# Patient Record
Sex: FEMALE | Race: BLACK OR AFRICAN AMERICAN | NOT HISPANIC OR LATINO | Employment: FULL TIME | URBAN - METROPOLITAN AREA
[De-identification: names, ages, dates, MRNs, and addresses within clinical notes are randomized per-mention and may not be internally consistent; named-entity substitution may affect disease eponyms.]

---

## 2017-03-27 ENCOUNTER — HOSPITAL ENCOUNTER (OUTPATIENT)
Dept: RADIOLOGY | Facility: HOSPITAL | Age: 42
Discharge: HOME | End: 2017-03-27
Attending: SPECIALIST
Payer: COMMERCIAL

## 2017-03-27 DIAGNOSIS — Z12.31 VISIT FOR SCREENING MAMMOGRAM: ICD-10-CM

## 2017-03-27 PROCEDURE — 77063 BREAST TOMOSYNTHESIS BI: CPT | Mod: 26,,, | Performed by: RADIOLOGY

## 2017-03-27 PROCEDURE — 77067 SCR MAMMO BI INCL CAD: CPT | Mod: TC

## 2017-03-27 PROCEDURE — 77067 SCR MAMMO BI INCL CAD: CPT | Mod: 26,,, | Performed by: RADIOLOGY

## 2018-01-12 ENCOUNTER — TELEPHONE (OUTPATIENT)
Dept: FAMILY MEDICINE | Facility: CLINIC | Age: 43
End: 2018-01-12

## 2018-01-12 NOTE — TELEPHONE ENCOUNTER
----- Message from Kae Ellis sent at 1/12/2018  1:35 PM CST -----  Contact: Self. [phone]  Patient would like to speak with you about rescheduling her appointment for the week of 2/12/18. Please advise

## 2018-02-12 ENCOUNTER — PATIENT MESSAGE (OUTPATIENT)
Dept: FAMILY MEDICINE | Facility: CLINIC | Age: 43
End: 2018-02-12

## 2018-02-12 ENCOUNTER — OFFICE VISIT (OUTPATIENT)
Dept: FAMILY MEDICINE | Facility: CLINIC | Age: 43
End: 2018-02-12
Payer: COMMERCIAL

## 2018-02-12 ENCOUNTER — LAB VISIT (OUTPATIENT)
Dept: LAB | Facility: HOSPITAL | Age: 43
End: 2018-02-12
Attending: FAMILY MEDICINE
Payer: COMMERCIAL

## 2018-02-12 VITALS
WEIGHT: 154.31 LBS | BODY MASS INDEX: 30.29 KG/M2 | HEIGHT: 60 IN | HEART RATE: 65 BPM | SYSTOLIC BLOOD PRESSURE: 110 MMHG | TEMPERATURE: 98 F | DIASTOLIC BLOOD PRESSURE: 71 MMHG | OXYGEN SATURATION: 100 %

## 2018-02-12 DIAGNOSIS — K21.9 GASTROESOPHAGEAL REFLUX DISEASE, ESOPHAGITIS PRESENCE NOT SPECIFIED: ICD-10-CM

## 2018-02-12 DIAGNOSIS — Z23 NEEDS FLU SHOT: ICD-10-CM

## 2018-02-12 DIAGNOSIS — Z00.00 ROUTINE GENERAL MEDICAL EXAMINATION AT A HEALTH CARE FACILITY: Primary | ICD-10-CM

## 2018-02-12 DIAGNOSIS — Z00.00 ROUTINE GENERAL MEDICAL EXAMINATION AT A HEALTH CARE FACILITY: ICD-10-CM

## 2018-02-12 LAB
ALBUMIN SERPL BCP-MCNC: 3.9 G/DL
ALP SERPL-CCNC: 67 U/L
ALT SERPL W/O P-5'-P-CCNC: 14 U/L
ANION GAP SERPL CALC-SCNC: 7 MMOL/L
AST SERPL-CCNC: 19 U/L
BASOPHILS # BLD AUTO: 0.04 K/UL
BASOPHILS NFR BLD: 0.7 %
BILIRUB SERPL-MCNC: 0.5 MG/DL
BUN SERPL-MCNC: 12 MG/DL
CALCIUM SERPL-MCNC: 9.3 MG/DL
CHLORIDE SERPL-SCNC: 107 MMOL/L
CHOLEST SERPL-MCNC: 176 MG/DL
CHOLEST/HDLC SERPL: 3.7 {RATIO}
CO2 SERPL-SCNC: 24 MMOL/L
CREAT SERPL-MCNC: 0.8 MG/DL
DIFFERENTIAL METHOD: NORMAL
EOSINOPHIL # BLD AUTO: 0.2 K/UL
EOSINOPHIL NFR BLD: 3.7 %
ERYTHROCYTE [DISTWIDTH] IN BLOOD BY AUTOMATED COUNT: 13.4 %
EST. GFR  (AFRICAN AMERICAN): >60 ML/MIN/1.73 M^2
EST. GFR  (NON AFRICAN AMERICAN): >60 ML/MIN/1.73 M^2
ESTIMATED AVG GLUCOSE: 91 MG/DL
GLUCOSE SERPL-MCNC: 83 MG/DL
HBA1C MFR BLD HPLC: 4.8 %
HCT VFR BLD AUTO: 38.7 %
HDLC SERPL-MCNC: 47 MG/DL
HDLC SERPL: 26.7 %
HGB BLD-MCNC: 12.4 G/DL
LDLC SERPL CALC-MCNC: 115.4 MG/DL
LYMPHOCYTES # BLD AUTO: 2 K/UL
LYMPHOCYTES NFR BLD: 33.7 %
MCH RBC QN AUTO: 28.5 PG
MCHC RBC AUTO-ENTMCNC: 32 G/DL
MCV RBC AUTO: 89 FL
MONOCYTES # BLD AUTO: 0.6 K/UL
MONOCYTES NFR BLD: 9.8 %
NEUTROPHILS # BLD AUTO: 3.1 K/UL
NEUTROPHILS NFR BLD: 51.8 %
NONHDLC SERPL-MCNC: 129 MG/DL
PLATELET # BLD AUTO: 275 K/UL
PMV BLD AUTO: 10.6 FL
POTASSIUM SERPL-SCNC: 4.4 MMOL/L
PROT SERPL-MCNC: 7.7 G/DL
RBC # BLD AUTO: 4.35 M/UL
SODIUM SERPL-SCNC: 138 MMOL/L
TRIGL SERPL-MCNC: 68 MG/DL
TSH SERPL DL<=0.005 MIU/L-ACNC: 2.06 UIU/ML
WBC # BLD AUTO: 5.91 K/UL

## 2018-02-12 PROCEDURE — 90471 IMMUNIZATION ADMIN: CPT | Mod: S$GLB,,, | Performed by: FAMILY MEDICINE

## 2018-02-12 PROCEDURE — 83036 HEMOGLOBIN GLYCOSYLATED A1C: CPT

## 2018-02-12 PROCEDURE — 36415 COLL VENOUS BLD VENIPUNCTURE: CPT

## 2018-02-12 PROCEDURE — 80053 COMPREHEN METABOLIC PANEL: CPT

## 2018-02-12 PROCEDURE — 99396 PREV VISIT EST AGE 40-64: CPT | Mod: 25,S$GLB,, | Performed by: FAMILY MEDICINE

## 2018-02-12 PROCEDURE — 85025 COMPLETE CBC W/AUTO DIFF WBC: CPT

## 2018-02-12 PROCEDURE — 80061 LIPID PANEL: CPT

## 2018-02-12 PROCEDURE — 90686 IIV4 VACC NO PRSV 0.5 ML IM: CPT | Mod: S$GLB,,, | Performed by: FAMILY MEDICINE

## 2018-02-12 PROCEDURE — 84443 ASSAY THYROID STIM HORMONE: CPT

## 2018-02-12 PROCEDURE — 99999 PR PBB SHADOW E&M-EST. PATIENT-LVL III: CPT | Mod: PBBFAC,,, | Performed by: FAMILY MEDICINE

## 2018-02-12 RX ORDER — PANTOPRAZOLE SODIUM 40 MG/1
40 TABLET, DELAYED RELEASE ORAL DAILY
Qty: 30 TABLET | Refills: 11 | Status: SHIPPED | OUTPATIENT
Start: 2018-02-12 | End: 2020-12-23

## 2018-02-12 NOTE — PROGRESS NOTES
"(Portions of this note were dictated using voice recognition software and may contain dictation related errors in spelling/grammar/syntax not found on text review)    CC:   Chief Complaint   Patient presents with    Annual Exam       HPI: 43 y.o. female     Last seen by me in 2015    GERD: No current medications. Takes Zantac daily. Does report some epigastric discomfort and breakthru reflux sx. No exercise, diet "so/so"    Got over cold recently. Improving PND, congestion, cough.occ ha. Taking occ NSAIDS which has been helping.     Past Medical History:   Diagnosis Date    GERD (gastroesophageal reflux disease)        Past Surgical History:   Procedure Laterality Date    NO PAST SURGERIES         Family History   Problem Relation Age of Onset    Breast cancer Mother     Diabetes Maternal Aunt     Hypertension Maternal Aunt     Diabetes       multiple     Coronary artery disease Maternal Grandmother      non premature    Colon cancer Neg Hx        Social History     Social History    Marital status: Single     Spouse name: N/A    Number of children: N/A    Years of education: N/A     Occupational History    Not on file.     Social History Main Topics    Smoking status: Never Smoker    Smokeless tobacco: Not on file    Alcohol use 0.0 oz/week      Comment: occasional    Drug use: No    Sexual activity: Not on file     Other Topics Concern    Not on file     Social History Narrative    Active but no exercise    Eating habits "ok"              Immunizations:  tdap 2015  Flu  today     Endoscopy 2016                        - Small hiatus hernia.                        - Gastritis. Biopsied.                        - Normal examined duodenum.  Recommendation:       - Recommend acid suppression medication.                        - Follow an antireflux regimen.                        - Return to GI clinic PRN.                        - Discharge patient to home.    Age/Gender Appropriate " screenings:  Gyn/pap: Sees Dr. Jody Huber and is up-to-date on her Pap smears     Lab Results   Component Value Date    WBC 5.47 11/29/2016    HGB 12.4 11/29/2016    HCT 38.6 11/29/2016     11/29/2016    CHOL 197 12/23/2015    TRIG 60 12/23/2015    HDL 53 12/23/2015    ALT 22 12/23/2015    AST 21 12/23/2015     12/23/2015    K 3.8 12/23/2015     12/23/2015    CREATININE 0.8 12/23/2015    CALCIUM 9.0 12/23/2015    BUN 14 12/23/2015    CO2 21 (L) 12/23/2015    TSH 3.314 12/23/2015    HGBA1C 5.2 12/23/2015    LDLCALC 132.0 12/23/2015    GLU 69 (L) 12/23/2015                   Vital signs reviewed  PE:   APPEARANCE: Well nourished, well developed, in no acute distress.    HEAD: Normocephalic, atraumatic.  EYES: PERRL. EOMI.   Conjunctivae noninjected.  EARS: TM's intact. Light reflex normal. No retraction or perforation.  Left-sided middle ear effusion noted.  NOSE:   Inflamed nasal turbinates bilaterally  MOUTH & THROAT: No tonsillar enlargement. No pharyngeal erythema or exudate.   NECK: Supple with no cervical lymphadenopathy.  No carotid bruits, no thyromegaly  CHEST: Good inspiratory effort. Lungs clear to auscultation with no wheezes or crackles.  CARDIOVASCULAR: Normal S1, S2. No rubs, murmurs, or gallops.  ABDOMEN: Bowel sounds normal. Not distended. Soft. No tenderness or masses. No organomegaly.  EXTREMITIES: No edema, cyanosis, or clubbing.      IMPRESSION  1. Routine general medical examination at a health care facility    2. Gastroesophageal reflux disease, esophagitis presence not specified    3. Needs flu shot          PLAN  Orders Placed This Encounter   Procedures    Influenza - Quadrivalent (3 years & older) (PF)    CBC auto differential    Comprehensive metabolic panel    Hemoglobin A1c    Lipid panel    TSH     GERD: Discussed precautions diet.  Try pantoprazole 40 mg daily for 4-6 weeks in place of Zantac.  If not effective, can add back Zantac    Counseled regarding  exercise    URI symptoms: Improving    Flu shot today    Labs above

## 2018-02-12 NOTE — PATIENT INSTRUCTIONS

## 2018-02-12 NOTE — MEDICAL/APP STUDENT
Subjective:       Patient ID: Aramis Rodarte is a 43 y.o. female.    Chief Complaint: Annual Exam    Patient here for annual.    New complaints: Patient had a cold the past week and has some lingering symptoms currently: runny nose, headache, postnasal drip and sinus pressure. Denies fevers/chills, no nausea or vomiting. Patient feels this is improving but still takes occasional ibuprofen/aleve for headaches.     Patient acid reflux: endoscopy showed hiatal hernia, currently taking daily zantac, reports feeling some symptoms: chest tightness, constant epigastric heaviness sliding up and down sometimes with meals.         Diet: so-so, exercise: none     Sleep: well, 7hrs a night    Patient is wanting to get flu shot today.       Review of Systems   Constitutional: Negative for appetite change, chills and fever.   HENT: Positive for congestion, postnasal drip and sinus pressure. Negative for sore throat and tinnitus.    Eyes: Negative for pain and visual disturbance.   Respiratory: Positive for cough. Negative for chest tightness and shortness of breath.    Cardiovascular: Negative for chest pain and palpitations.   Gastrointestinal: Negative for abdominal pain, constipation, diarrhea, nausea and vomiting.   Endocrine: Negative for cold intolerance and heat intolerance.   Genitourinary: Negative for difficulty urinating and menstrual problem.   Neurological: Positive for headaches. Negative for dizziness and light-headedness.   Psychiatric/Behavioral: Negative.        Objective:      Physical Exam   Constitutional: She appears well-developed and well-nourished.   HENT:   Head: Normocephalic.   Eyes: Pupils are equal, round, and reactive to light.   Neck: Normal range of motion.   Cardiovascular: Normal rate, regular rhythm and intact distal pulses.    Pulmonary/Chest: Effort normal and breath sounds normal.   Abdominal: Soft. Bowel sounds are normal. She exhibits no distension. There is no tenderness.   Neurological:  She is alert.   Skin: Skin is warm.       Assessment:       Patient here for annual exam  Plan:       Acid Reflux  - change from zantac to pantoprazole 40mg for 4-6 weeks to see if any improvement  - advised that can take zantac in addition if symptoms do not improve    Health maintenance  - annual labs today: CBC, CMP, HbA1c, TSH, Lipid panel  - flu shot today    - advised on diet/exericise

## 2018-03-16 DIAGNOSIS — Z12.31 VISIT FOR SCREENING MAMMOGRAM: Primary | ICD-10-CM

## 2018-04-02 ENCOUNTER — HOSPITAL ENCOUNTER (OUTPATIENT)
Dept: RADIOLOGY | Facility: HOSPITAL | Age: 43
Discharge: HOME | End: 2018-04-02
Attending: SPECIALIST
Payer: COMMERCIAL

## 2018-04-02 DIAGNOSIS — Z12.31 VISIT FOR SCREENING MAMMOGRAM: ICD-10-CM

## 2018-04-02 PROCEDURE — 77067 SCR MAMMO BI INCL CAD: CPT | Mod: TC,PO

## 2019-03-22 DIAGNOSIS — Z12.39 SCREENING BREAST EXAMINATION: Primary | ICD-10-CM

## 2019-04-15 ENCOUNTER — HOSPITAL ENCOUNTER (OUTPATIENT)
Dept: RADIOLOGY | Facility: HOSPITAL | Age: 44
Discharge: HOME | End: 2019-04-15
Attending: SPECIALIST
Payer: COMMERCIAL

## 2019-04-15 DIAGNOSIS — Z12.39 SCREENING BREAST EXAMINATION: ICD-10-CM

## 2019-04-15 PROCEDURE — 77067 SCR MAMMO BI INCL CAD: CPT | Mod: TC,PO

## 2019-04-17 LAB — HUMAN PAPILLOMAVIRUS (HPV): NORMAL

## 2020-01-20 ENCOUNTER — OFFICE VISIT (OUTPATIENT)
Dept: FAMILY MEDICINE | Facility: CLINIC | Age: 45
End: 2020-01-20
Payer: COMMERCIAL

## 2020-01-20 VITALS
TEMPERATURE: 99 F | HEART RATE: 79 BPM | BODY MASS INDEX: 27.61 KG/M2 | WEIGHT: 140.63 LBS | OXYGEN SATURATION: 99 % | SYSTOLIC BLOOD PRESSURE: 132 MMHG | DIASTOLIC BLOOD PRESSURE: 80 MMHG | HEIGHT: 60 IN

## 2020-01-20 DIAGNOSIS — Z83.3 FAMILY HISTORY OF DIABETES MELLITUS: ICD-10-CM

## 2020-01-20 DIAGNOSIS — Z00.00 ROUTINE GENERAL MEDICAL EXAMINATION AT A HEALTH CARE FACILITY: Primary | ICD-10-CM

## 2020-01-20 PROCEDURE — 90471 FLU VACCINE (QUAD) GREATER THAN OR EQUAL TO 3YO PRESERVATIVE FREE IM: ICD-10-PCS | Mod: S$GLB,,, | Performed by: FAMILY MEDICINE

## 2020-01-20 PROCEDURE — 99396 PR PREVENTIVE VISIT,EST,40-64: ICD-10-PCS | Mod: 25,S$GLB,, | Performed by: FAMILY MEDICINE

## 2020-01-20 PROCEDURE — 99999 PR PBB SHADOW E&M-EST. PATIENT-LVL III: CPT | Mod: PBBFAC,,, | Performed by: FAMILY MEDICINE

## 2020-01-20 PROCEDURE — 90471 IMMUNIZATION ADMIN: CPT | Mod: S$GLB,,, | Performed by: FAMILY MEDICINE

## 2020-01-20 PROCEDURE — 90686 FLU VACCINE (QUAD) GREATER THAN OR EQUAL TO 3YO PRESERVATIVE FREE IM: ICD-10-PCS | Mod: S$GLB,,, | Performed by: FAMILY MEDICINE

## 2020-01-20 PROCEDURE — 99999 PR PBB SHADOW E&M-EST. PATIENT-LVL III: ICD-10-PCS | Mod: PBBFAC,,, | Performed by: FAMILY MEDICINE

## 2020-01-20 PROCEDURE — 90686 IIV4 VACC NO PRSV 0.5 ML IM: CPT | Mod: S$GLB,,, | Performed by: FAMILY MEDICINE

## 2020-01-20 PROCEDURE — 99396 PREV VISIT EST AGE 40-64: CPT | Mod: 25,S$GLB,, | Performed by: FAMILY MEDICINE

## 2020-01-20 NOTE — PROGRESS NOTES
"(Portions of this note were dictated using voice recognition software and may contain dictation related errors in spelling/grammar/syntax not found on text review)    CC:   Chief Complaint   Patient presents with    Annual Exam       HPI: 44 y.o. female     Last seen by me in 2015    GERD: No current medications. Takes Zantac daily. Does report some epigastric discomfort and breakthru reflux sx. No exercise, diet "so/so".  Last EGD below    Had episode when woke up from sleep--stomach cramping, went to [facility] for BM, felt very flushed and nauseated and sweating. Lay down on floor and felt better. Hasn't happened again.     Past Medical History:   Diagnosis Date    GERD (gastroesophageal reflux disease)        Past Surgical History:   Procedure Laterality Date    NO PAST SURGERIES         Family History   Problem Relation Age of Onset    Breast cancer Mother     Diabetes Maternal Aunt     Hypertension Maternal Aunt     Diabetes Unknown         multiple     Coronary artery disease Maternal Grandmother         non premature    Valvular heart disease Father     Colon cancer Neg Hx        Social History     Socioeconomic History    Marital status: Single     Spouse name: Not on file    Number of children: Not on file    Years of education: Not on file    Highest education level: Not on file   Occupational History    Not on file   Social Needs    Financial resource strain: Not on file    Food insecurity:     Worry: Not on file     Inability: Not on file    Transportation needs:     Medical: Not on file     Non-medical: Not on file   Tobacco Use    Smoking status: Never Smoker   Substance and Sexual Activity    Alcohol use: Yes     Alcohol/week: 0.0 standard drinks     Comment: occasional    Drug use: No    Sexual activity: Not on file   Lifestyle    Physical activity:     Days per week: Not on file     Minutes per session: Not on file    Stress: Not on file   Relationships    Social connections:     " "Talks on phone: Not on file     Gets together: Not on file     Attends [employer] service: Not on file     Active member of club or organization: Not on file     Attends meetings of clubs or organizations: Not on file     Relationship status: Not on file   Other Topics Concern    Not on file   Social History Narrative    Active but no exercise    Eating habits "ok"               Lab Results   Component Value Date    WBC 5.91 02/12/2018    HGB 12.4 02/12/2018    HCT 38.7 02/12/2018     02/12/2018    CHOL 176 02/12/2018    TRIG 68 02/12/2018    HDL 47 02/12/2018    ALT 14 02/12/2018    AST 19 02/12/2018     02/12/2018    K 4.4 02/12/2018     02/12/2018    CREATININE 0.8 02/12/2018    CALCIUM 9.3 02/12/2018    BUN 12 02/12/2018    CO2 24 02/12/2018    TSH 2.056 02/12/2018    HGBA1C 4.8 02/12/2018    LDLCALC 115.4 02/12/2018    GLU 83 02/12/2018                   Vital signs reviewed  PE:   APPEARANCE: Well nourished, well developed, in no acute distress.    HEAD: Normocephalic, atraumatic.  EYES: PERRL. EOMI.   Conjunctivae noninjected.  EARS: TM's intact. Light reflex normal. No retraction or perforation.  Left-sided middle ear effusion noted.  NOSE:   Inflamed nasal turbinates bilaterally  MOUTH & THROAT: No tonsillar enlargement. No pharyngeal erythema or exudate.   NECK: Supple with no cervical lymphadenopathy.  No carotid bruits, no thyromegaly  CHEST: Good inspiratory effort. Lungs clear to auscultation with no wheezes or crackles.  CARDIOVASCULAR: Normal S1, S2. No rubs, murmurs, or gallops.  ABDOMEN: Bowel sounds normal. Not distended. Soft. No tenderness or masses. No organomegaly.  EXTREMITIES: No edema, cyanosis, or clubbing.      IMPRESSION  1. Routine general medical examination at a health care facility    2. Family history of diabetes mellitus          PLAN  Orders Placed This Encounter   Procedures    CBC auto differential    Comprehensive metabolic panel    Lipid panel    TSH    " Hemoglobin A1c     Labs above     on healthy diet and routine exercise      SCREENINGS:  Immunizations:  tdap 2015  Flu today         Endoscopy 2016                        - Small hiatus hernia.                        - Gastritis. Biopsied.                        - Normal examined duodenum.  .    Age/Gender Appropriate screenings:  Gyn/pap: Sees Dr. Jody Huber and is up-to-date on her Pap smears and mammograms

## 2020-01-22 ENCOUNTER — TELEPHONE (OUTPATIENT)
Dept: FAMILY MEDICINE | Facility: CLINIC | Age: 45
End: 2020-01-22

## 2020-01-22 NOTE — TELEPHONE ENCOUNTER
----- Message from Andrew Leo sent at 1/22/2020  1:40 PM CST -----  Contact: Pt   Pt would like to be called back regarding lab appt on Saturday. Pt would like to speak with nurse about changing to [place] location.    Pt can be reached at  [phone].    Thank You.

## 2020-01-25 ENCOUNTER — LAB VISIT (OUTPATIENT)
Dept: LAB | Facility: HOSPITAL | Age: 45
End: 2020-01-25
Attending: FAMILY MEDICINE
Payer: COMMERCIAL

## 2020-01-25 DIAGNOSIS — Z00.00 ROUTINE GENERAL MEDICAL EXAMINATION AT A HEALTH CARE FACILITY: ICD-10-CM

## 2020-01-25 DIAGNOSIS — Z83.3 FAMILY HISTORY OF DIABETES MELLITUS: ICD-10-CM

## 2020-01-25 LAB
ALBUMIN SERPL BCP-MCNC: 3.9 G/DL (ref 3.5–5.2)
ALP SERPL-CCNC: 58 U/L (ref 55–135)
ALT SERPL W/O P-5'-P-CCNC: 13 U/L (ref 10–44)
ANION GAP SERPL CALC-SCNC: 8 MMOL/L (ref 8–16)
AST SERPL-CCNC: 17 U/L (ref 10–40)
BASOPHILS # BLD AUTO: 0.06 K/UL (ref 0–0.2)
BASOPHILS NFR BLD: 1.4 % (ref 0–1.9)
BILIRUB SERPL-MCNC: 0.6 MG/DL (ref 0.1–1)
BUN SERPL-MCNC: 13 MG/DL (ref 6–20)
CALCIUM SERPL-MCNC: 9.2 MG/DL (ref 8.7–10.5)
CHLORIDE SERPL-SCNC: 108 MMOL/L (ref 95–110)
CHOLEST SERPL-MCNC: 196 MG/DL (ref 120–199)
CHOLEST/HDLC SERPL: 4.2 {RATIO} (ref 2–5)
CO2 SERPL-SCNC: 26 MMOL/L (ref 23–29)
CREAT SERPL-MCNC: 0.8 MG/DL (ref 0.5–1.4)
DIFFERENTIAL METHOD: ABNORMAL
EOSINOPHIL # BLD AUTO: 0.1 K/UL (ref 0–0.5)
EOSINOPHIL NFR BLD: 2.8 % (ref 0–8)
ERYTHROCYTE [DISTWIDTH] IN BLOOD BY AUTOMATED COUNT: 13 % (ref 11.5–14.5)
EST. GFR  (AFRICAN AMERICAN): >60 ML/MIN/1.73 M^2
EST. GFR  (NON AFRICAN AMERICAN): >60 ML/MIN/1.73 M^2
ESTIMATED AVG GLUCOSE: 97 MG/DL (ref 68–131)
GLUCOSE SERPL-MCNC: 85 MG/DL (ref 70–110)
HBA1C MFR BLD HPLC: 5 % (ref 4–5.6)
HCT VFR BLD AUTO: 39.7 % (ref 37–48.5)
HDLC SERPL-MCNC: 47 MG/DL (ref 40–75)
HDLC SERPL: 24 % (ref 20–50)
HGB BLD-MCNC: 12.6 G/DL (ref 12–16)
LDLC SERPL CALC-MCNC: 140 MG/DL (ref 63–159)
LYMPHOCYTES # BLD AUTO: 1.3 K/UL (ref 1–4.8)
LYMPHOCYTES NFR BLD: 29.1 % (ref 18–48)
MCH RBC QN AUTO: 28.5 PG (ref 27–31)
MCHC RBC AUTO-ENTMCNC: 31.7 G/DL (ref 32–36)
MCV RBC AUTO: 90 FL (ref 82–98)
MONOCYTES # BLD AUTO: 0.5 K/UL (ref 0.3–1)
MONOCYTES NFR BLD: 11.2 % (ref 4–15)
NEUTROPHILS # BLD AUTO: 2.4 K/UL (ref 1.8–7.7)
NEUTROPHILS NFR BLD: 55.5 % (ref 38–73)
NONHDLC SERPL-MCNC: 149 MG/DL
PLATELET # BLD AUTO: 292 K/UL (ref 150–350)
PMV BLD AUTO: 10.7 FL (ref 9.2–12.9)
POTASSIUM SERPL-SCNC: 4.1 MMOL/L (ref 3.5–5.1)
PROT SERPL-MCNC: 7.6 G/DL (ref 6–8.4)
RBC # BLD AUTO: 4.42 M/UL (ref 4–5.4)
SODIUM SERPL-SCNC: 142 MMOL/L (ref 136–145)
TRIGL SERPL-MCNC: 45 MG/DL (ref 30–150)
TSH SERPL DL<=0.005 MIU/L-ACNC: 0.73 UIU/ML (ref 0.4–4)
WBC # BLD AUTO: 4.29 K/UL (ref 3.9–12.7)

## 2020-01-25 PROCEDURE — 36415 COLL VENOUS BLD VENIPUNCTURE: CPT

## 2020-01-25 PROCEDURE — 80061 LIPID PANEL: CPT

## 2020-01-25 PROCEDURE — 84443 ASSAY THYROID STIM HORMONE: CPT

## 2020-01-25 PROCEDURE — 80053 COMPREHEN METABOLIC PANEL: CPT

## 2020-01-25 PROCEDURE — 83036 HEMOGLOBIN GLYCOSYLATED A1C: CPT

## 2020-01-25 PROCEDURE — 85025 COMPLETE CBC W/AUTO DIFF WBC: CPT

## 2020-01-27 ENCOUNTER — PATIENT MESSAGE (OUTPATIENT)
Dept: FAMILY MEDICINE | Facility: CLINIC | Age: 45
End: 2020-01-27

## 2020-03-17 ENCOUNTER — OFFICE VISIT (OUTPATIENT)
Dept: FAMILY MEDICINE | Facility: CLINIC | Age: 45
End: 2020-03-17
Payer: COMMERCIAL

## 2020-03-17 VITALS
OXYGEN SATURATION: 100 % | BODY MASS INDEX: 27.18 KG/M2 | WEIGHT: 138.44 LBS | TEMPERATURE: 99 F | HEART RATE: 69 BPM | HEIGHT: 60 IN | DIASTOLIC BLOOD PRESSURE: 78 MMHG | SYSTOLIC BLOOD PRESSURE: 128 MMHG

## 2020-03-17 DIAGNOSIS — R07.89 CHEST WALL PAIN: Primary | ICD-10-CM

## 2020-03-17 PROCEDURE — 99213 PR OFFICE/OUTPT VISIT, EST, LEVL III, 20-29 MIN: ICD-10-PCS | Mod: S$GLB,,, | Performed by: FAMILY MEDICINE

## 2020-03-17 PROCEDURE — 99999 PR PBB SHADOW E&M-EST. PATIENT-LVL III: CPT | Mod: PBBFAC,,, | Performed by: FAMILY MEDICINE

## 2020-03-17 PROCEDURE — 3008F BODY MASS INDEX DOCD: CPT | Mod: CPTII,S$GLB,, | Performed by: FAMILY MEDICINE

## 2020-03-17 PROCEDURE — 99213 OFFICE O/P EST LOW 20 MIN: CPT | Mod: S$GLB,,, | Performed by: FAMILY MEDICINE

## 2020-03-17 PROCEDURE — 3008F PR BODY MASS INDEX (BMI) DOCUMENTED: ICD-10-PCS | Mod: CPTII,S$GLB,, | Performed by: FAMILY MEDICINE

## 2020-03-17 PROCEDURE — 99999 PR PBB SHADOW E&M-EST. PATIENT-LVL III: ICD-10-PCS | Mod: PBBFAC,,, | Performed by: FAMILY MEDICINE

## 2020-03-17 NOTE — PATIENT INSTRUCTIONS
Costochondritis    Costochondritis is inflammation of a rib or the cartilage that connects a rib to your breastbone (sternum). It causes tenderness, and sometimes chest pain may be sharp or aching, or it may feel like pressure. Pain may get worse with deep breathing, movement, or exercise. In some cases, the pain is mistaken for a heart attack. Despite this, the condition is not serious. Read on to learn more about the condition and how it can be treated.  What causes costochondritis?  The cause of costochondritis is not completely clear, but it may happen after a chest injury, chest infection, or coughing episode. Some physical activities can sometimes lead to costochondritis. Large-breasted women may be more likely to have the condition. Often, the reason for the inflammation is unknown.  Diagnosing costochondritis  There is no test for costochondritis. The condition is diagnosed by the symptoms you have. Your healthcare provider will perform a physical exam. He or she will ask you about your symptoms and examine your chest for tenderness. In some cases, tests are done to rule out more serious problems. These tests may include imaging tests such as chest X-ray, CT scan, or an ECG.  Treating costochondritis  If an underlying cause is found, treatment for that will likely relieve the problem. Costochondritis often goes away on its own. The course of the condition varies from person to person. It usually lasts from weeks to months. In some cases, mild symptoms continue for months to years. To ease symptoms:  · Take medicine as directed. These relieve pain and swelling. Ibuprofen or other NSAIDs are often recommended. In some cases, you may be given prescription medicine, such as muscle relaxants.  · Avoid activities that put stress on the chest or spine.  · Apply a heating pad (set to warm, not too high, heat) to the breastbone several times a day.  · Perform stretching exercises as directed.  Call the healthcare  provider right away if you have any of the following:  · Pain that is not relieved by medicine  · Shortness of breath  · Lightheadedness, dizziness, or fainting  · Feeling of irregular heartbeat or fast pulse  Anyone with chest pain should see a healthcare provider, especially those who are older and may be at risk for heart disease.   Date Last Reviewed: 10/1/2016  © 2596-3237 [email or web address]. [address] [place] [address], [place] [address]. All rights reserved. This information is not intended as a substitute for professional medical care. Always follow your healthcare professional's instructions.

## 2020-03-17 NOTE — PROGRESS NOTES
(Portions of this note were dictated using voice recognition software and may contain dictation related errors in spelling/grammar/syntax not found on text review)    CC:   Chief Complaint   Patient presents with    Breast Pain     Left Breast       HPI: 45 y.o. female left breast pain for 3-4 days, did notice a lump or skin changes.  Noticed pain over the area although mainly when she is at home in without her bra.  Has not had no problems during the day.  Has not tried any meds.  No fevers chills or sweats.  No nipple discharge.  Mammogram up-to-date from 04/15/2019, BI-RADS category 2 benign.  TC score 18.7.  She states that she did have 1 time where she had to go back for an ultrasound exam but this was all normal.  This is not in the chart.  Mom has breast cancer    Past Medical History:   Diagnosis Date    GERD (gastroesophageal reflux disease)        Past Surgical History:   Procedure Laterality Date    NO PAST SURGERIES         Family History   Problem Relation Age of Onset    Breast cancer Mother     Diabetes Maternal Aunt     Hypertension Maternal Aunt     Diabetes Unknown         multiple     Coronary artery disease Maternal Grandmother         non premature    Valvular heart disease Father     Colon cancer Neg Hx        Social History     Tobacco Use    Smoking status: Never Smoker   Substance Use Topics    Alcohol use: Yes     Alcohol/week: 0.0 standard drinks     Comment: occasional    Drug use: No       Lab Results   Component Value Date    WBC 4.29 01/25/2020    HGB 12.6 01/25/2020    HCT 39.7 01/25/2020    MCV 90 01/25/2020     01/25/2020    CHOL 196 01/25/2020    TRIG 45 01/25/2020    HDL 47 01/25/2020    ALT 13 01/25/2020    AST 17 01/25/2020    BILITOT 0.6 01/25/2020    ALKPHOS 58 01/25/2020     01/25/2020    K 4.1 01/25/2020     01/25/2020    CREATININE 0.8 01/25/2020    ESTGFRAFRICA >60 01/25/2020    EGFRNONAA >60 01/25/2020    CALCIUM 9.2 01/25/2020    ALBUMIN  3.9 01/25/2020    BUN 13 01/25/2020    CO2 26 01/25/2020    TSH 0.729 01/25/2020    HGBA1C 5.0 01/25/2020    LDLCALC 140.0 01/25/2020    GLU 85 01/25/2020                 ROS:  GENERAL: No fever, chills, fatigability or weight loss.  SKIN: No rashes, no itching.  HEAD: No headaches.  EYES: No visual changes  EARS: No ear pain or changes in hearing.  NOSE: No congestion or rhinorrhea.  MOUTH & THROAT: No hoarseness, change in voice, or sore throat.  NODES: Denies swollen glands.  CHEST:  Above.  CARDIOVASCULAR: Denies chest pain, PND, orthopnea.  ABDOMEN: No nausea, vomiting, or changes in bowel function.  URINARY: No flank pain, dysuria or hematuria.  PERIPHERAL VASCULAR: No claudication or cyanosis.  MUSCULOSKELETAL: No joint stiffness or swelling. Denies back pain.  NEUROLOGIC: No weakness or numbness.    Vital signs reviewed  PE:   APPEARANCE: Well nourished, well developed, in no acute distress.    HEAD: Normocephalic, atraumatic.  EYES:    Conjunctivae noninjected.  BREASTS:  Left breast exam demonstrates some generalized fibrocystic change but no definitive lumps or nodules noted.  Area of defined pain seems more related to costochondral junction since movement of the breast tissue over that area and medial to that area does not really impact the location of discomfort over the costochondral junction.  No axillary lymphadenopathy.  No nipple masses or discharge noted.  IMPRESSION  1. Chest wall pain            PLAN  Treat as costochondritis with NSAIDs, stretching exercises.  Notify if symptoms worsen over the course of the next week or so including skin changes, palpable nodularity, systemic symptoms, axillary lymphadenopathy which may indicate need for diagnostic mammography and ultrasound.  Otherwise she can proceed with her normal mammogram  as scheduled with her gyn in April if symptoms are improving

## 2020-05-06 ENCOUNTER — HOSPITAL ENCOUNTER (OUTPATIENT)
Dept: RADIOLOGY | Facility: HOSPITAL | Age: 45
Discharge: HOME | End: 2020-05-06
Attending: SPECIALIST
Payer: COMMERCIAL

## 2020-05-06 DIAGNOSIS — Z12.31 ENCOUNTER FOR SCREENING MAMMOGRAM FOR BREAST CANCER: ICD-10-CM

## 2020-05-06 PROCEDURE — 77067 SCR MAMMO BI INCL CAD: CPT | Mod: TC,PO

## 2020-05-12 ENCOUNTER — PATIENT OUTREACH (OUTPATIENT)
Dept: ADMINISTRATIVE | Facility: HOSPITAL | Age: 45
End: 2020-05-12

## 2020-12-23 ENCOUNTER — OFFICE VISIT (OUTPATIENT)
Dept: FAMILY MEDICINE | Facility: CLINIC | Age: 45
End: 2020-12-23
Payer: COMMERCIAL

## 2020-12-23 VITALS
HEART RATE: 76 BPM | HEIGHT: 61 IN | WEIGHT: 149 LBS | DIASTOLIC BLOOD PRESSURE: 78 MMHG | BODY MASS INDEX: 28.13 KG/M2 | SYSTOLIC BLOOD PRESSURE: 130 MMHG

## 2020-12-23 DIAGNOSIS — Z79.899 LONG-TERM USE OF HIGH-RISK MEDICATION: ICD-10-CM

## 2020-12-23 DIAGNOSIS — Z78.9 USES BIRTH CONTROL: ICD-10-CM

## 2020-12-23 DIAGNOSIS — K44.9 HIATAL HERNIA: ICD-10-CM

## 2020-12-23 DIAGNOSIS — Z13.6 SCREENING FOR ISCHEMIC HEART DISEASE (IHD): ICD-10-CM

## 2020-12-23 DIAGNOSIS — Z13.89 SCREENING FOR BLOOD OR PROTEIN IN URINE: ICD-10-CM

## 2020-12-23 DIAGNOSIS — Z00.00 ANNUAL PHYSICAL EXAM: Primary | ICD-10-CM

## 2020-12-23 DIAGNOSIS — G43.829 MENSTRUAL MIGRAINE WITHOUT STATUS MIGRAINOSUS, NOT INTRACTABLE: ICD-10-CM

## 2020-12-23 DIAGNOSIS — Z13.29 SCREENING FOR ENDOCRINE DISORDER: ICD-10-CM

## 2020-12-23 PROCEDURE — 99386 PREV VISIT NEW AGE 40-64: CPT | Mod: S$GLB,,, | Performed by: FAMILY MEDICINE

## 2020-12-23 PROCEDURE — 99386 PR PREVENTIVE VISIT,NEW,40-64: ICD-10-PCS | Mod: S$GLB,,, | Performed by: FAMILY MEDICINE

## 2020-12-23 PROCEDURE — 3008F BODY MASS INDEX DOCD: CPT | Mod: S$GLB,,, | Performed by: FAMILY MEDICINE

## 2020-12-23 PROCEDURE — 3008F PR BODY MASS INDEX (BMI) DOCUMENTED: ICD-10-PCS | Mod: S$GLB,,, | Performed by: FAMILY MEDICINE

## 2020-12-23 RX ORDER — NORGESTIMATE AND ETHINYL ESTRADIOL 0.25-0.035
1 KIT ORAL DAILY
COMMUNITY
Start: 2020-10-26 | End: 2022-08-10

## 2020-12-23 NOTE — PROGRESS NOTES
SUBJECTIVE:    Patient ID: Aramis Rodarte is a 45 y.o. female.    Chief Complaint: Annual Exam and Establish Care    Pt here for exam and to established.    Hasn't been to see MD in a long time.     Last labs were a few years ago.      Works at [employer].    FMx of breast cancer, mom with breast cancer in 30s.. no genetic testing and no colon cancer.     Has just resumed exercise, walks 3 days a week.     Has a constant heaviness in her chest that is always there. Was taking omeprazole but it wasn't working. Has been feeling it since after having kids    Has bad migraines before menses, that make her nauseated. Will lay down. Not too much cramping.  ------------------------------------------------------------------------  Has had mammogram, (Dr. Huber) 2 kids (20y and 18y)  Dud for pap in August 2021, never abnl      No visits with results within 6 Month(s) from this visit.   Latest known visit with results is:   Patient Outreach on 05/12/2020   Component Date Value Ref Range Status    HPV DNA 04/17/2019 None Detected  None Detected Final       Past Medical History:   Diagnosis Date    GERD (gastroesophageal reflux disease)     Hiatal hernia      Social History     Socioeconomic History    Marital status: Single     Spouse name: Not on file    Number of children: Not on file    Years of education: Not on file    Highest education level: Not on file   Occupational History    Not on file   Social Needs    Financial resource strain: Somewhat hard    Food insecurity     Worry: Never true     Inability: Never true    Transportation needs     Medical: No     Non-medical: No   Tobacco Use    Smoking status: Never Smoker    Smokeless tobacco: Never Used   Substance and Sexual Activity    Alcohol use: Yes     Alcohol/week: 0.0 standard drinks     Frequency: Never     Binge frequency: Never     Comment: occasional    Drug use: No    Sexual activity: Not on file   Lifestyle    Physical  "activity     Days per week: 3 days     Minutes per session: 70 min    Stress: Not at all   Relationships    Social connections     Talks on phone: Twice a week     Gets together: Never     Attends Ten[employer] service: Not on file     Active member of club or organization: No     Attends meetings of clubs or organizations: Not on file     Relationship status: Never    Other Topics Concern    Not on file   Social History Narrative    Active but no exercise    Eating habits "ok"     Past Surgical History:   Procedure Laterality Date    NO PAST SURGERIES       Family History   Problem Relation Age of Onset    Breast cancer Mother     Diabetes Maternal Aunt     Hypertension Maternal Aunt     Diabetes Unknown         multiple     Coronary artery disease Maternal Grandmother         non premature    Valvular heart disease Father     Colon cancer Neg Hx        Review of patient's allergies indicates:  No Known Allergies    Current Outpatient Medications:     SPRINTEC, 28, 0.25-35 mg-mcg per tablet, Take 1 tablet by mouth once daily., Disp: , Rfl:     Review of Systems   Constitutional: Negative for appetite change, fatigue, fever and unexpected weight change.   Respiratory: Negative for cough, chest tightness, shortness of breath and wheezing.    Cardiovascular: Negative for chest pain and leg swelling.   Gastrointestinal: Negative for abdominal pain, constipation, nausea and vomiting.        -heartburn   Genitourinary: Negative for difficulty urinating, dysuria, frequency and urgency.   Musculoskeletal: Negative for arthralgias, back pain, myalgias and neck pain.   Skin: Negative for rash.   Neurological: Negative for dizziness, weakness, numbness and headaches.   Hematological: Does not bruise/bleed easily.   Psychiatric/Behavioral: Negative for dysphoric mood, sleep disturbance and suicidal ideas. The patient is not nervous/anxious.    All other systems reviewed and are negative.         Objective:    " "  Vitals:    12/23/20 1500   BP: 130/78   Pulse: 76   Weight: 67.6 kg (149 lb)   Height: 5' 0.5" (1.537 m)     Physical Exam  Vitals signs reviewed.   Constitutional:       General: She is not in acute distress.     Appearance: Normal appearance. She is well-developed and overweight.   HENT:      Head: Normocephalic and atraumatic.   Neck:      Musculoskeletal: Neck supple.      Thyroid: No thyromegaly.   Cardiovascular:      Rate and Rhythm: Normal rate and regular rhythm.      Heart sounds: Normal heart sounds. No murmur. No friction rub.   Pulmonary:      Effort: Pulmonary effort is normal.      Breath sounds: Normal breath sounds. No wheezing or rales.   Abdominal:      General: Bowel sounds are normal. There is no distension.      Palpations: Abdomen is soft.      Tenderness: There is no abdominal tenderness.   Lymphadenopathy:      Cervical: No cervical adenopathy.   Skin:     General: Skin is warm and dry.      Findings: No rash.   Neurological:      Mental Status: She is alert and oriented to person, place, and time.   Psychiatric:         Attention and Perception: She is attentive.         Speech: Speech normal.         Behavior: Behavior normal.         Thought Content: Thought content normal.         Judgment: Judgment normal.           Assessment:       1. Annual physical exam    2. Menstrual migraine without status migrainosus, not intractable    3. Hiatal hernia    4. Uses birth control    5. Screening for ischemic heart disease (IHD)    6. Screening for blood or protein in urine    7. Long-term use of high-risk medication    8. Screening for endocrine disorder         Plan:       Annual physical exam  -     Comprehensive Metabolic Panel; Future; Expected date: 12/23/2020  -     Lipid Panel; Future; Expected date: 12/23/2020  -     Urinalysis; Future; Expected date: 12/23/2020  -     CBC Auto Differential; Future; Expected date: 12/23/2020  -     TSH w/reflex to FT4; Future; Expected date: " 12/23/2020    Menstrual migraine without status migrainosus, not intractable  Comments:  Controlled. Will continue to monitor frequency and severity. To continue conservative treatment.  Orders:  -     TSH w/reflex to FT4; Future; Expected date: 12/23/2020    Hiatal hernia  Comments:  Not obstructed. Pt given instruction to monitor size and warnings for obstruction  Orders:  -     CBC Auto Differential; Future; Expected date: 12/23/2020    Uses birth control  Comments:  Tolerating.     Screening for ischemic heart disease (IHD)  -     Comprehensive Metabolic Panel; Future; Expected date: 12/23/2020  -     Lipid Panel; Future; Expected date: 12/23/2020    Screening for blood or protein in urine  -     Urinalysis; Future; Expected date: 12/23/2020    Long-term use of high-risk medication  -     Comprehensive Metabolic Panel; Future; Expected date: 12/23/2020  -     Lipid Panel; Future; Expected date: 12/23/2020  -     Urinalysis; Future; Expected date: 12/23/2020  -     CBC Auto Differential; Future; Expected date: 12/23/2020  -     TSH w/reflex to FT4; Future; Expected date: 12/23/2020    Screening for endocrine disorder  -     TSH w/reflex to FT4; Future; Expected date: 12/23/2020    Labs and/or tests have been ordered for the evaluation/monitoring of acute/chronic conditions, to be done just before next visit.    Counseled on age and gender appropriate medical preventative services, including cancer screenings, immunizations, overall nutritional health, need for a consistent exercise regimen and an overall push towards maintaining a vigorous and active lifestyle    Follow up in about 1 year (around 12/23/2021).        12/28/2020 Tho Alfonso

## 2021-01-03 LAB
ALBUMIN SERPL-MCNC: 3.9 G/DL (ref 3.6–5.1)
ALBUMIN/GLOB SERPL: 1.3 (CALC) (ref 1–2.5)
ALP SERPL-CCNC: 39 U/L (ref 31–125)
ALT SERPL-CCNC: 10 U/L (ref 6–29)
APPEARANCE UR: CLEAR
AST SERPL-CCNC: 17 U/L (ref 10–30)
BASOPHILS # BLD AUTO: 103 CELLS/UL (ref 0–200)
BASOPHILS NFR BLD AUTO: 1.8 %
BILIRUB SERPL-MCNC: 0.6 MG/DL (ref 0.2–1.2)
BILIRUB UR QL STRIP: NEGATIVE
BUN SERPL-MCNC: 13 MG/DL (ref 7–25)
BUN/CREAT SERPL: NORMAL (CALC) (ref 6–22)
CALCIUM SERPL-MCNC: 8.9 MG/DL (ref 8.6–10.2)
CHLORIDE SERPL-SCNC: 108 MMOL/L (ref 98–110)
CHOLEST SERPL-MCNC: 199 MG/DL
CHOLEST/HDLC SERPL: 3.4 (CALC)
CO2 SERPL-SCNC: 23 MMOL/L (ref 20–32)
COLOR UR: YELLOW
CREAT SERPL-MCNC: 0.89 MG/DL (ref 0.5–1.1)
EOSINOPHIL # BLD AUTO: 171 CELLS/UL (ref 15–500)
EOSINOPHIL NFR BLD AUTO: 3 %
ERYTHROCYTE [DISTWIDTH] IN BLOOD BY AUTOMATED COUNT: 11.9 % (ref 11–15)
GFRSERPLBLD MDRD-ARVRAT: 79 ML/MIN/1.73M2
GLOBULIN SER CALC-MCNC: 3.1 G/DL (CALC) (ref 1.9–3.7)
GLUCOSE SERPL-MCNC: 86 MG/DL (ref 65–99)
GLUCOSE UR QL STRIP: NEGATIVE
HCT VFR BLD AUTO: 38.7 % (ref 35–45)
HDLC SERPL-MCNC: 58 MG/DL
HGB BLD-MCNC: 12.3 G/DL (ref 11.7–15.5)
HGB UR QL STRIP: NEGATIVE
KETONES UR QL STRIP: NEGATIVE
LDLC SERPL CALC-MCNC: 123 MG/DL (CALC)
LEUKOCYTE ESTERASE UR QL STRIP: NEGATIVE
LYMPHOCYTES # BLD AUTO: 1915 CELLS/UL (ref 850–3900)
LYMPHOCYTES NFR BLD AUTO: 33.6 %
MCH RBC QN AUTO: 28.1 PG (ref 27–33)
MCHC RBC AUTO-ENTMCNC: 31.8 G/DL (ref 32–36)
MCV RBC AUTO: 88.6 FL (ref 80–100)
MONOCYTES # BLD AUTO: 610 CELLS/UL (ref 200–950)
MONOCYTES NFR BLD AUTO: 10.7 %
NEUTROPHILS # BLD AUTO: 2901 CELLS/UL (ref 1500–7800)
NEUTROPHILS NFR BLD AUTO: 50.9 %
NITRITE UR QL STRIP: NEGATIVE
NONHDLC SERPL-MCNC: 141 MG/DL (CALC)
PH UR STRIP: 6.5 [PH] (ref 5–8)
PLATELET # BLD AUTO: 267 THOUSAND/UL (ref 140–400)
PMV BLD REES-ECKER: 11.6 FL (ref 7.5–12.5)
POTASSIUM SERPL-SCNC: 4.5 MMOL/L (ref 3.5–5.3)
PROT SERPL-MCNC: 7 G/DL (ref 6.1–8.1)
PROT UR QL STRIP: NEGATIVE
RBC # BLD AUTO: 4.37 MILLION/UL (ref 3.8–5.1)
SODIUM SERPL-SCNC: 139 MMOL/L (ref 135–146)
SP GR UR STRIP: 1.02 (ref 1–1.03)
TRIGL SERPL-MCNC: 79 MG/DL
TSH SERPL-ACNC: 2.05 MIU/L
WBC # BLD AUTO: 5.7 THOUSAND/UL (ref 3.8–10.8)

## 2021-03-27 ENCOUNTER — IMMUNIZATION (OUTPATIENT)
Dept: PRIMARY CARE CLINIC | Facility: CLINIC | Age: 46
End: 2021-03-27
Payer: COMMERCIAL

## 2021-03-27 DIAGNOSIS — Z23 NEED FOR VACCINATION: Primary | ICD-10-CM

## 2021-03-27 PROCEDURE — 0001A COVID-19, MRNA, LNP-S, PF, 30 MCG/0.3 ML DOSE VACCINE: ICD-10-PCS | Mod: CV19,S$GLB,, | Performed by: FAMILY MEDICINE

## 2021-03-27 PROCEDURE — 0001A COVID-19, MRNA, LNP-S, PF, 30 MCG/0.3 ML DOSE VACCINE: CPT | Mod: CV19,S$GLB,, | Performed by: FAMILY MEDICINE

## 2021-03-27 PROCEDURE — 91300 COVID-19, MRNA, LNP-S, PF, 30 MCG/0.3 ML DOSE VACCINE: CPT | Mod: S$GLB,,, | Performed by: FAMILY MEDICINE

## 2021-03-27 PROCEDURE — 91300 COVID-19, MRNA, LNP-S, PF, 30 MCG/0.3 ML DOSE VACCINE: ICD-10-PCS | Mod: S$GLB,,, | Performed by: FAMILY MEDICINE

## 2021-04-17 ENCOUNTER — IMMUNIZATION (OUTPATIENT)
Dept: PRIMARY CARE CLINIC | Facility: CLINIC | Age: 46
End: 2021-04-17
Payer: COMMERCIAL

## 2021-04-17 DIAGNOSIS — Z23 NEED FOR VACCINATION: Primary | ICD-10-CM

## 2021-04-17 PROCEDURE — 0002A COVID-19, MRNA, LNP-S, PF, 30 MCG/0.3 ML DOSE VACCINE: CPT | Mod: CV19,S$GLB,, | Performed by: FAMILY MEDICINE

## 2021-04-17 PROCEDURE — 91300 COVID-19, MRNA, LNP-S, PF, 30 MCG/0.3 ML DOSE VACCINE: ICD-10-PCS | Mod: S$GLB,,, | Performed by: FAMILY MEDICINE

## 2021-04-17 PROCEDURE — 0002A COVID-19, MRNA, LNP-S, PF, 30 MCG/0.3 ML DOSE VACCINE: ICD-10-PCS | Mod: CV19,S$GLB,, | Performed by: FAMILY MEDICINE

## 2021-04-17 PROCEDURE — 91300 COVID-19, MRNA, LNP-S, PF, 30 MCG/0.3 ML DOSE VACCINE: CPT | Mod: S$GLB,,, | Performed by: FAMILY MEDICINE

## 2021-04-26 DIAGNOSIS — Z12.31 VISIT FOR SCREENING MAMMOGRAM: Primary | ICD-10-CM

## 2021-05-12 ENCOUNTER — HOSPITAL ENCOUNTER (OUTPATIENT)
Dept: RADIOLOGY | Facility: CLINIC | Age: 46
Discharge: HOME | End: 2021-05-12
Attending: SPECIALIST
Payer: COMMERCIAL

## 2021-05-12 DIAGNOSIS — Z12.31 VISIT FOR SCREENING MAMMOGRAM: ICD-10-CM

## 2021-05-12 PROCEDURE — 77063 MAMMO DIGITAL SCREENING BILAT WITH TOMO: ICD-10-PCS | Mod: 26,,, | Performed by: RADIOLOGY

## 2021-05-12 PROCEDURE — 77067 SCR MAMMO BI INCL CAD: CPT | Mod: 26,,, | Performed by: RADIOLOGY

## 2021-05-12 PROCEDURE — 77063 BREAST TOMOSYNTHESIS BI: CPT | Mod: 26,,, | Performed by: RADIOLOGY

## 2021-05-12 PROCEDURE — 77067 SCR MAMMO BI INCL CAD: CPT | Mod: TC,PO

## 2021-05-12 PROCEDURE — 77067 MAMMO DIGITAL SCREENING BILAT WITH TOMO: ICD-10-PCS | Mod: 26,,, | Performed by: RADIOLOGY

## 2021-11-03 ENCOUNTER — OFFICE VISIT (OUTPATIENT)
Dept: FAMILY MEDICINE | Facility: CLINIC | Age: 46
End: 2021-11-03
Payer: COMMERCIAL

## 2021-11-03 VITALS
SYSTOLIC BLOOD PRESSURE: 138 MMHG | HEART RATE: 64 BPM | DIASTOLIC BLOOD PRESSURE: 78 MMHG | HEIGHT: 61 IN | WEIGHT: 158 LBS | BODY MASS INDEX: 29.83 KG/M2

## 2021-11-03 DIAGNOSIS — Z13.89 SCREENING FOR BLOOD OR PROTEIN IN URINE: ICD-10-CM

## 2021-11-03 DIAGNOSIS — Z13.6 SCREENING FOR ISCHEMIC HEART DISEASE (IHD): ICD-10-CM

## 2021-11-03 DIAGNOSIS — G43.829 MENSTRUAL MIGRAINE WITHOUT STATUS MIGRAINOSUS, NOT INTRACTABLE: ICD-10-CM

## 2021-11-03 DIAGNOSIS — K44.9 HIATAL HERNIA: ICD-10-CM

## 2021-11-03 DIAGNOSIS — Z23 INFLUENZA VACCINATION ADMINISTERED AT CURRENT VISIT: ICD-10-CM

## 2021-11-03 DIAGNOSIS — Z79.899 LONG-TERM USE OF HIGH-RISK MEDICATION: ICD-10-CM

## 2021-11-03 DIAGNOSIS — R07.89 CHEST HEAVINESS: Primary | ICD-10-CM

## 2021-11-03 DIAGNOSIS — Z13.29 SCREENING FOR ENDOCRINE DISORDER: ICD-10-CM

## 2021-11-03 PROCEDURE — 3008F PR BODY MASS INDEX (BMI) DOCUMENTED: ICD-10-PCS | Mod: S$GLB,,, | Performed by: FAMILY MEDICINE

## 2021-11-03 PROCEDURE — 90682 FLU VACCINE - QUADRIVALENT (RECOMBINANT) PRESERVATIVE FREE: ICD-10-PCS | Mod: S$GLB,,, | Performed by: FAMILY MEDICINE

## 2021-11-03 PROCEDURE — 1160F PR REVIEW ALL MEDS BY PRESCRIBER/CLIN PHARMACIST DOCUMENTED: ICD-10-PCS | Mod: S$GLB,,, | Performed by: FAMILY MEDICINE

## 2021-11-03 PROCEDURE — 99213 PR OFFICE/OUTPT VISIT, EST, LEVL III, 20-29 MIN: ICD-10-PCS | Mod: 25,S$GLB,, | Performed by: FAMILY MEDICINE

## 2021-11-03 PROCEDURE — 3008F BODY MASS INDEX DOCD: CPT | Mod: S$GLB,,, | Performed by: FAMILY MEDICINE

## 2021-11-03 PROCEDURE — 90471 FLU VACCINE - QUADRIVALENT (RECOMBINANT) PRESERVATIVE FREE: ICD-10-PCS | Mod: S$GLB,,, | Performed by: FAMILY MEDICINE

## 2021-11-03 PROCEDURE — 90471 IMMUNIZATION ADMIN: CPT | Mod: S$GLB,,, | Performed by: FAMILY MEDICINE

## 2021-11-03 PROCEDURE — 90682 RIV4 VACC RECOMBINANT DNA IM: CPT | Mod: S$GLB,,, | Performed by: FAMILY MEDICINE

## 2021-11-03 PROCEDURE — 1160F RVW MEDS BY RX/DR IN RCRD: CPT | Mod: S$GLB,,, | Performed by: FAMILY MEDICINE

## 2021-11-03 PROCEDURE — 99213 OFFICE O/P EST LOW 20 MIN: CPT | Mod: 25,S$GLB,, | Performed by: FAMILY MEDICINE

## 2021-11-05 ENCOUNTER — PATIENT MESSAGE (OUTPATIENT)
Dept: FAMILY MEDICINE | Facility: CLINIC | Age: 46
End: 2021-11-05
Payer: COMMERCIAL

## 2021-11-05 ENCOUNTER — TELEPHONE (OUTPATIENT)
Dept: FAMILY MEDICINE | Facility: CLINIC | Age: 46
End: 2021-11-05
Payer: COMMERCIAL

## 2022-02-01 ENCOUNTER — OFFICE VISIT (OUTPATIENT)
Dept: CARDIOLOGY | Facility: CLINIC | Age: 47
End: 2022-02-01
Payer: COMMERCIAL

## 2022-02-01 VITALS
HEART RATE: 71 BPM | SYSTOLIC BLOOD PRESSURE: 120 MMHG | DIASTOLIC BLOOD PRESSURE: 80 MMHG | OXYGEN SATURATION: 98 % | BODY MASS INDEX: 30.43 KG/M2 | RESPIRATION RATE: 16 BRPM | WEIGHT: 155 LBS | HEIGHT: 60 IN

## 2022-02-01 DIAGNOSIS — R07.89 CHEST HEAVINESS: Primary | ICD-10-CM

## 2022-02-01 DIAGNOSIS — E78.2 MIXED HYPERLIPIDEMIA: ICD-10-CM

## 2022-02-01 DIAGNOSIS — K21.9 GASTROESOPHAGEAL REFLUX DISEASE, UNSPECIFIED WHETHER ESOPHAGITIS PRESENT: ICD-10-CM

## 2022-02-01 DIAGNOSIS — K44.9 HIATAL HERNIA: ICD-10-CM

## 2022-02-01 PROCEDURE — 93000 EKG 12-LEAD: ICD-10-PCS | Mod: S$GLB,,, | Performed by: INTERNAL MEDICINE

## 2022-02-01 PROCEDURE — 1160F PR REVIEW ALL MEDS BY PRESCRIBER/CLIN PHARMACIST DOCUMENTED: ICD-10-PCS | Mod: CPTII,S$GLB,, | Performed by: INTERNAL MEDICINE

## 2022-02-01 PROCEDURE — 1159F MED LIST DOCD IN RCRD: CPT | Mod: CPTII,S$GLB,, | Performed by: INTERNAL MEDICINE

## 2022-02-01 PROCEDURE — 99205 OFFICE O/P NEW HI 60 MIN: CPT | Mod: S$GLB,,, | Performed by: INTERNAL MEDICINE

## 2022-02-01 PROCEDURE — 3074F PR MOST RECENT SYSTOLIC BLOOD PRESSURE < 130 MM HG: ICD-10-PCS | Mod: CPTII,S$GLB,, | Performed by: INTERNAL MEDICINE

## 2022-02-01 PROCEDURE — 93000 ELECTROCARDIOGRAM COMPLETE: CPT | Mod: S$GLB,,, | Performed by: INTERNAL MEDICINE

## 2022-02-01 PROCEDURE — 3074F SYST BP LT 130 MM HG: CPT | Mod: CPTII,S$GLB,, | Performed by: INTERNAL MEDICINE

## 2022-02-01 PROCEDURE — 3008F PR BODY MASS INDEX (BMI) DOCUMENTED: ICD-10-PCS | Mod: CPTII,S$GLB,, | Performed by: INTERNAL MEDICINE

## 2022-02-01 PROCEDURE — 1159F PR MEDICATION LIST DOCUMENTED IN MEDICAL RECORD: ICD-10-PCS | Mod: CPTII,S$GLB,, | Performed by: INTERNAL MEDICINE

## 2022-02-01 PROCEDURE — 99205 PR OFFICE/OUTPT VISIT, NEW, LEVL V, 60-74 MIN: ICD-10-PCS | Mod: S$GLB,,, | Performed by: INTERNAL MEDICINE

## 2022-02-01 PROCEDURE — 3079F PR MOST RECENT DIASTOLIC BLOOD PRESSURE 80-89 MM HG: ICD-10-PCS | Mod: CPTII,S$GLB,, | Performed by: INTERNAL MEDICINE

## 2022-02-01 PROCEDURE — 3079F DIAST BP 80-89 MM HG: CPT | Mod: CPTII,S$GLB,, | Performed by: INTERNAL MEDICINE

## 2022-02-01 PROCEDURE — 3008F BODY MASS INDEX DOCD: CPT | Mod: CPTII,S$GLB,, | Performed by: INTERNAL MEDICINE

## 2022-02-01 PROCEDURE — 1160F RVW MEDS BY RX/DR IN RCRD: CPT | Mod: CPTII,S$GLB,, | Performed by: INTERNAL MEDICINE

## 2022-02-01 RX ORDER — AMLODIPINE BESYLATE 2.5 MG/1
2.5 TABLET ORAL DAILY
Qty: 30 TABLET | Refills: 2 | Status: CANCELLED | OUTPATIENT
Start: 2022-02-01 | End: 2023-02-01

## 2022-02-01 RX ORDER — AMLODIPINE BESYLATE 2.5 MG/1
2.5 TABLET ORAL DAILY
COMMUNITY
End: 2022-02-01 | Stop reason: SDUPTHER

## 2022-02-01 NOTE — PROGRESS NOTES
Subjective:    Patient ID:  Aramis Rodarte is a 46 y.o. female       Chief Complaint   Patient presents with    Chest Pain       HPI:  Ms Aramis Rodarte is a 46 y.o. female is here for initial consultation.  Patient stated that she has a strong family history of coronary artery disease both her parents as well as a grandparents have heart disease and is concerned.  She wanted to have testing done because she has been having heaviness in the chest in the substernal area and the xiphoid process area.  And this has been going on for few years.  And the heaviness is constant and versus most of the day she has tried different medications it has not helped.  And she also had an EGD done was found to have a hiatal hernia apart from that she does not have anything else going on.  Patient denies any exertional chest pain or tightness or heaviness.  Her breathing is good denies any shortness of breath or difficulty in breathing denies any dizziness or lightheadedness loss of consciousness falls or head injury.  Patient states that she has been more cognizant of eating healthy of food.  Even though she does not exercise on regular basis she is quite active.    Review of patient's allergies indicates:  No Known Allergies    Past Medical History:   Diagnosis Date    GERD (gastroesophageal reflux disease)     Hiatal hernia      Past Surgical History:   Procedure Laterality Date    NO PAST SURGERIES       Social History     Tobacco Use    Smoking status: Never Smoker    Smokeless tobacco: Never Used   Substance Use Topics    Alcohol use: Yes     Alcohol/week: 0.0 standard drinks     Comment: occasional    Drug use: No     Family History   Problem Relation Age of Onset    Breast cancer Mother     Diabetes Maternal Aunt     Hypertension Maternal Aunt     Diabetes Unknown         multiple     Coronary artery disease Maternal Grandmother         non premature    Valvular heart disease Father     Colon cancer Neg Hx          Review of Systems:   Constitution: Negative for diaphoresis and fever.   HEENT: Negative for nosebleeds.    Cardiovascular:  Epigastric chest heaviness.       No dyspnea on exertion       No leg swelling        No palpitations  Respiratory: Negative for shortness of breath and wheezing.    Hematologic/Lymphatic: Negative for bleeding problem. Does not bruise/bleed easily.   Skin: Negative for color change and rash.   Musculoskeletal: Negative for falls and myalgias.   Gastrointestinal: Negative for hematemesis and hematochezia.  History of hiatal hernia  Genitourinary: Negative for hematuria.   Neurological: Negative for dizziness and light-headedness.   Psychiatric/Behavioral: Negative for altered mental status and memory loss.          Objective:        Vitals:    02/01/22 1645   BP: 120/80   Pulse: 71   Resp: 16       Lab Results   Component Value Date    WBC 5.7 01/02/2021    HGB 12.3 01/02/2021    HCT 38.7 01/02/2021     01/02/2021    CHOL 199 01/02/2021    TRIG 79 01/02/2021    HDL 58 01/02/2021    ALT 10 01/02/2021    AST 17 01/02/2021     01/02/2021    K 4.5 01/02/2021     01/02/2021    CREATININE 0.89 01/02/2021    BUN 13 01/02/2021    CO2 23 01/02/2021    TSH 0.729 01/25/2020    HGBA1C 5.0 01/25/2020        ECHOCARDIOGRAM RESULTS  No results found for this or any previous visit.    No valid procedures specified.   No results found for this or any previous visit.      Physical Exam:  CONSTITUTIONAL: No fever, no chills  HEENT: Normocephalic, atraumatic,pupils reactive to light                 NECK:  No JVD no carotid bruit  CVS: S1S2+, RRR, no murmurs,   LUNGS: Clear  ABDOMEN: Soft, NT, BS+  EXTREMITIES: No cyanosis, edema  : No thorpe catheter  NEURO: AAO X 3  PSY: Normal affect      Medication List with Changes/Refills   Current Medications    SPRINTEC, 28, 0.25-35 MG-MCG PER TABLET    Take 1 tablet by mouth once daily.         Assessment:       1. Chest heaviness    2. Hiatal  hernia    3. Mixed hyperlipidemia    4. Gastroesophageal reflux disease, unspecified whether esophagitis present         Plan:   1. Patient has been having recurrent chest discomfort etiology is unclear at the present time.  Will schedule her for exercise stress Cardiolite to evaluate for ischemia.  She has a strong family history of coronary artery disease.  2. It is possible that her discomfort would also be coming from gallbladder issues patient to discuss with the primary physician in regards with having ultrasound of the gallbladder if it was not done earlier.  3. Will also do a 2D echocardiogram for LV function ejection fraction and wall motion abnormality.  4. Reviewed her EKG independently patient is in normal sinus rhythm with heart rate of 71 beats per minute normal intervals and no acute ST T-wave changes.  Essentially within normal limits.  5.  Patient's blood pressure is 120/80 and consideration for amlodipine 2.5 mg tablet half a tablet on an did daily basis and see if that would alleviate any of the discomfort.  If he does then we can increase the dosage.  6. Continue current management I will see her back in the office after the testing is completed.    Problem List Items Addressed This Visit        Cardiac/Vascular    Mixed hyperlipidemia       GI    Gastroesophageal reflux disease    Hiatal hernia       Other    Chest heaviness - Primary    Overview     Constant. Will refer to cardiology.         Relevant Orders    IN OFFICE EKG 12-LEAD (to [place])          No follow-ups on file.

## 2022-02-02 RX ORDER — AMLODIPINE BESYLATE 2.5 MG/1
2.5 TABLET ORAL DAILY
Qty: 90 TABLET | Refills: 1 | Status: SHIPPED | OUTPATIENT
Start: 2022-02-02 | End: 2022-03-17

## 2022-02-03 ENCOUNTER — TELEPHONE (OUTPATIENT)
Dept: CARDIOLOGY | Facility: CLINIC | Age: 47
End: 2022-02-03
Payer: COMMERCIAL

## 2022-02-03 NOTE — TELEPHONE ENCOUNTER
----- Message from Dante Ashby sent at 2/3/2022  9:47 AM CST -----  Contact: pt  Type: Needs Medical Advice    Who Called:pt  Best Call Back Number:[phone]    Additional Information: Requesting callback regarding needing a nurse to call back  Please Advise-Thank you

## 2022-03-10 ENCOUNTER — TELEPHONE (OUTPATIENT)
Dept: CARDIOLOGY | Facility: CLINIC | Age: 47
End: 2022-03-10
Payer: COMMERCIAL

## 2022-03-10 NOTE — TELEPHONE ENCOUNTER
----- Message from Dante Ashby sent at 3/10/2022 10:09 AM CST -----  Contact: pt  Missed call please call back    [phone]

## 2022-03-11 ENCOUNTER — HOSPITAL ENCOUNTER (OUTPATIENT)
Dept: RADIOLOGY | Facility: CLINIC | Age: 47
Discharge: HOME | End: 2022-03-11
Attending: INTERNAL MEDICINE
Payer: COMMERCIAL

## 2022-03-11 ENCOUNTER — HOSPITAL ENCOUNTER (OUTPATIENT)
Dept: CARDIOLOGY | Facility: CLINIC | Age: 47
Discharge: HOME | End: 2022-03-11
Attending: INTERNAL MEDICINE
Payer: COMMERCIAL

## 2022-03-11 DIAGNOSIS — K44.9 HIATAL HERNIA: ICD-10-CM

## 2022-03-11 DIAGNOSIS — R07.89 CHEST HEAVINESS: ICD-10-CM

## 2022-03-11 DIAGNOSIS — E78.2 MIXED HYPERLIPIDEMIA: ICD-10-CM

## 2022-03-11 DIAGNOSIS — K21.9 GASTROESOPHAGEAL REFLUX DISEASE, UNSPECIFIED WHETHER ESOPHAGITIS PRESENT: ICD-10-CM

## 2022-03-11 PROCEDURE — 78452 HT MUSCLE IMAGE SPECT MULT: CPT | Mod: S$GLB,,, | Performed by: INTERNAL MEDICINE

## 2022-03-11 PROCEDURE — A9502 STRESS TEST WITH MYOCARDIAL PERFUSION (CUPID ONLY): ICD-10-PCS | Mod: S$GLB,,, | Performed by: INTERNAL MEDICINE

## 2022-03-11 PROCEDURE — 78452 STRESS TEST WITH MYOCARDIAL PERFUSION (CUPID ONLY): ICD-10-PCS | Mod: S$GLB,,, | Performed by: INTERNAL MEDICINE

## 2022-03-11 PROCEDURE — 93015 CV STRESS TEST SUPVJ I&R: CPT | Mod: S$GLB,,, | Performed by: INTERNAL MEDICINE

## 2022-03-11 PROCEDURE — A9502 TC99M TETROFOSMIN: HCPCS | Mod: S$GLB,,, | Performed by: INTERNAL MEDICINE

## 2022-03-11 PROCEDURE — 93015 STRESS TEST WITH MYOCARDIAL PERFUSION (CUPID ONLY): ICD-10-PCS | Mod: S$GLB,,, | Performed by: INTERNAL MEDICINE

## 2022-03-17 ENCOUNTER — OFFICE VISIT (OUTPATIENT)
Dept: CARDIOLOGY | Facility: CLINIC | Age: 47
End: 2022-03-17
Payer: COMMERCIAL

## 2022-03-17 VITALS
HEIGHT: 60 IN | DIASTOLIC BLOOD PRESSURE: 82 MMHG | BODY MASS INDEX: 30.43 KG/M2 | WEIGHT: 155 LBS | SYSTOLIC BLOOD PRESSURE: 140 MMHG | HEART RATE: 67 BPM | OXYGEN SATURATION: 99 %

## 2022-03-17 DIAGNOSIS — R07.89 CHEST HEAVINESS: ICD-10-CM

## 2022-03-17 DIAGNOSIS — K21.9 GASTROESOPHAGEAL REFLUX DISEASE, UNSPECIFIED WHETHER ESOPHAGITIS PRESENT: ICD-10-CM

## 2022-03-17 DIAGNOSIS — I10 HYPERTENSIVE RESPONSE TO EXERCISE: Primary | ICD-10-CM

## 2022-03-17 DIAGNOSIS — K44.9 HIATAL HERNIA: ICD-10-CM

## 2022-03-17 LAB
CV STRESS BASE HR: 65 BPM
DIASTOLIC BLOOD PRESSURE: 80 MMHG
EJECTION FRACTION- HIGH: 65 %
END DIASTOLIC INDEX-HIGH: 158 ML/M2
END DIASTOLIC INDEX-LOW: 94 ML/M2
END SYSTOLIC INDEX-HIGH: 71 ML/M2
END SYSTOLIC INDEX-LOW: 33 ML/M2
NUC STRESS DIASTOLIC VOLUME INDEX: 64
NUC STRESS EJECTION FRACTION: 69 %
NUC STRESS SYSTOLIC VOLUME INDEX: 20
OHS CV CPX 1 MINUTE RECOVERY HEART RATE: 141 BPM
OHS CV CPX 85 PERCENT MAX PREDICTED HEART RATE MALE: 141
OHS CV CPX ESTIMATED METS: 10
OHS CV CPX MAX PREDICTED HEART RATE: 166
OHS CV CPX PATIENT IS FEMALE: 1
OHS CV CPX PATIENT IS MALE: 0
OHS CV CPX PEAK DIASTOLIC BLOOD PRESSURE: 94 MMHG
OHS CV CPX PEAK HEAR RATE: 161 BPM
OHS CV CPX PEAK RATE PRESSURE PRODUCT: NORMAL
OHS CV CPX PEAK SYSTOLIC BLOOD PRESSURE: 184 MMHG
OHS CV CPX PERCENT MAX PREDICTED HEART RATE ACHIEVED: 97
OHS CV CPX RATE PRESSURE PRODUCT PRESENTING: 8320
RETIRED EF AND QEF - SEE NOTES: 53 %
STRESS ECHO POST EXERCISE DUR MIN: 7 MINUTES
STRESS ECHO POST EXERCISE DUR SEC: 43 SECONDS
STRESS ST DEPRESSION: 0.7 MM
SYSTOLIC BLOOD PRESSURE: 128 MMHG

## 2022-03-17 PROCEDURE — 3008F BODY MASS INDEX DOCD: CPT | Mod: CPTII,S$GLB,, | Performed by: NURSE PRACTITIONER

## 2022-03-17 PROCEDURE — 3077F PR MOST RECENT SYSTOLIC BLOOD PRESSURE >= 140 MM HG: ICD-10-PCS | Mod: CPTII,S$GLB,, | Performed by: NURSE PRACTITIONER

## 2022-03-17 PROCEDURE — 99214 OFFICE O/P EST MOD 30 MIN: CPT | Mod: S$GLB,,, | Performed by: NURSE PRACTITIONER

## 2022-03-17 PROCEDURE — 3079F PR MOST RECENT DIASTOLIC BLOOD PRESSURE 80-89 MM HG: ICD-10-PCS | Mod: CPTII,S$GLB,, | Performed by: NURSE PRACTITIONER

## 2022-03-17 PROCEDURE — 1160F PR REVIEW ALL MEDS BY PRESCRIBER/CLIN PHARMACIST DOCUMENTED: ICD-10-PCS | Mod: CPTII,S$GLB,, | Performed by: NURSE PRACTITIONER

## 2022-03-17 PROCEDURE — 3079F DIAST BP 80-89 MM HG: CPT | Mod: CPTII,S$GLB,, | Performed by: NURSE PRACTITIONER

## 2022-03-17 PROCEDURE — 1159F PR MEDICATION LIST DOCUMENTED IN MEDICAL RECORD: ICD-10-PCS | Mod: CPTII,S$GLB,, | Performed by: NURSE PRACTITIONER

## 2022-03-17 PROCEDURE — 3077F SYST BP >= 140 MM HG: CPT | Mod: CPTII,S$GLB,, | Performed by: NURSE PRACTITIONER

## 2022-03-17 PROCEDURE — 99214 PR OFFICE/OUTPT VISIT, EST, LEVL IV, 30-39 MIN: ICD-10-PCS | Mod: S$GLB,,, | Performed by: NURSE PRACTITIONER

## 2022-03-17 PROCEDURE — 1159F MED LIST DOCD IN RCRD: CPT | Mod: CPTII,S$GLB,, | Performed by: NURSE PRACTITIONER

## 2022-03-17 PROCEDURE — 1160F RVW MEDS BY RX/DR IN RCRD: CPT | Mod: CPTII,S$GLB,, | Performed by: NURSE PRACTITIONER

## 2022-03-17 PROCEDURE — 3008F PR BODY MASS INDEX (BMI) DOCUMENTED: ICD-10-PCS | Mod: CPTII,S$GLB,, | Performed by: NURSE PRACTITIONER

## 2022-03-17 RX ORDER — AMLODIPINE BESYLATE 2.5 MG/1
5 TABLET ORAL DAILY
Qty: 180 TABLET | Refills: 3 | Status: SHIPPED | OUTPATIENT
Start: 2022-03-17 | End: 2023-01-11

## 2022-03-17 NOTE — PROGRESS NOTES
Subjective:    Patient ID:  Aramis Rodarte is a 47 y.o. female   Chief Complaint   Patient presents with    Follow-up    Results     stress       HPI:  Patient presents today for follow up appointment and test results. She continues to have the constant midsternal chest pressure. No complaints related to exertion.    Review of patient's allergies indicates:  No Known Allergies    Past Medical History:   Diagnosis Date    GERD (gastroesophageal reflux disease)     Hiatal hernia      Past Surgical History:   Procedure Laterality Date    NO PAST SURGERIES       Social History     Tobacco Use    Smoking status: Never Smoker    Smokeless tobacco: Never Used   Substance Use Topics    Alcohol use: Yes     Alcohol/week: 0.0 standard drinks     Comment: occasional    Drug use: No     Family History   Problem Relation Age of Onset    Breast cancer Mother     Diabetes Maternal Aunt     Hypertension Maternal Aunt     Diabetes Unknown         multiple     Coronary artery disease Maternal Grandmother         non premature    Valvular heart disease Father     Colon cancer Neg Hx         Review of Systems:   Constitution: Negative for diaphoresis and fever.   HEENT: Negative for nosebleeds.    Cardiovascular: Negative for exertional chest pain       No dyspnea on exertion       No leg swelling        No palpitations  Respiratory: Negative for shortness of breath and wheezing.    Hematologic/Lymphatic: Negative for bleeding problem. Does not bruise/bleed easily.   Skin: Negative for color change and rash.   Musculoskeletal: Negative for falls and myalgias.   Gastrointestinal: Negative for hematemesis and hematochezia.   Genitourinary: Negative for hematuria.   Neurological: Negative for dizziness and light-headedness.   Psychiatric/Behavioral: Negative for altered mental status and memory loss.          Objective:        Vitals:    03/17/22 1656   BP: (!) 140/82   Pulse: 67       Lab Results   Component Value Date     WBC 5.7 01/02/2021    HGB 12.3 01/02/2021    HCT 38.7 01/02/2021     01/02/2021    CHOL 199 01/02/2021    TRIG 79 01/02/2021    HDL 58 01/02/2021    ALT 10 01/02/2021    AST 17 01/02/2021     01/02/2021    K 4.5 01/02/2021     01/02/2021    CREATININE 0.89 01/02/2021    BUN 13 01/02/2021    CO2 23 01/02/2021    TSH 0.729 01/25/2020    HGBA1C 5.0 01/25/2020        ECHOCARDIOGRAM RESULTS  Results for orders placed during the hospital encounter of 03/11/22    Echo    Interpretation Summary  · The left ventricle is normal in size with normal systolic function.  · The estimated ejection fraction is 65%.  · Normal left ventricular diastolic function.  · Normal right ventricular size with normal right ventricular systolic function.  · Mild mitral regurgitation.  · Mild to moderate tricuspid regurgitation.        CURRENT/PREVIOUS VISIT EKG  Results for orders placed or performed in visit on 02/01/22   IN OFFICE EKG 12-LEAD (to [place])    Collection Time: 02/01/22  4:50 PM    Narrative    Test Reason : R07.89,    Vent. Rate : 071 BPM     Atrial Rate : 071 BPM     P-R Int : 122 ms          QRS Dur : 072 ms      QT Int : 406 ms       P-R-T Axes : 049 028 028 degrees     QTc Int : 441 ms    Program found technically poor ECG  Normal sinus rhythm  Normal ECG  When compared with ECG of 30-MAR-2016 11:10,  No significant change was found  Confirmed by Jeronimo Zaragoza MD ([id number]) on 2/27/2022 9:17:56 PM    Referred By: CHRISTINE ARCEO           Confirmed By:Jeronimo Zaragoza MD     No valid procedures specified.   Results for orders placed during the hospital encounter of 03/11/22    Nuclear Stress - Cardiology Interpreted    Interpretation Summary    Normal myocardial perfusion scan. There is no evidence of myocardial ischemia or infarction.    The gated perfusion images showed an ejection fraction of 69% post stress. Normal ejection fraction is greater than 53%.    There is normal wall motion at rest and post  stress.    LV cavity size is normal at rest and normal at stress.    The EKG portion of this study is negative for ischemia.    The patient reported no chest pain during the stress test.    Patient exercised on a Andrew protocol for 7 minutes and 43 seconds and attained a maximum heart rate of 161 beats per minute which is 92% of the maximum predicted heart rate and attained 10.1 METS .  Patient had hypertensive response to exercise.      Physical Exam:  CONSTITUTIONAL: No fever, no chills  HEENT: Normocephalic, atraumatic,pupils reactive to light                 NECK:  No JVD no carotid bruit  CVS: S1S2+, RRR  LUNGS: Clear  ABDOMEN: Soft, NT, BS+  EXTREMITIES: No cyanosis, edema  : No thorpe catheter  NEURO: AAO X 3  PSY: Normal affect      Medication List with Changes/Refills   Current Medications    SPRINTEC, 28, 0.25-35 MG-MCG PER TABLET    Take 1 tablet by mouth once daily.   Changed and/or Refilled Medications    Modified Medication Previous Medication    AMLODIPINE (NORVASC) 2.5 MG TABLET amLODIPine (NORVASC) 2.5 MG tablet       Take 2 tablets (5 mg total) by mouth once daily.    Take 1 tablet (2.5 mg total) by mouth once daily.             Assessment:       1. Hypertensive response to exercise    2. Gastroesophageal reflux disease, unspecified whether esophagitis present    3. Hiatal hernia    4. Chest heaviness         Plan:     1. Nuclear stress test was negative for reversible ischemia.  2. Echocardiogram showed normal ejection fraction with no major valve issues noted.  3. Suspect that the discomfort is related to her hiatal hernia.  Will refer her to GI for evaluation and workup.  4. Patient did have a hypertensive response to exercise.  Will increase her amlodipine to 2.5 mg p.o. b.i.d..  Advised her to monitor her blood pressure at home.  5. Will see her back for follow-up in about 4 months.  May call return sooner problems arise.  Problem List Items Addressed This Visit        Unprioritized     Acid reflux    Relevant Orders    Ambulatory referral/consult to Gastroenterology    Hiatal hernia    Chest heaviness    Overview     Constant. Will refer to cardiology.             Other Visit Diagnoses     Hypertensive response to exercise    -  Primary          Follow up in about 4 months (around 7/17/2022).

## 2022-04-21 ENCOUNTER — TELEPHONE (OUTPATIENT)
Dept: FAMILY MEDICINE | Facility: CLINIC | Age: 47
End: 2022-04-21

## 2022-05-01 LAB
ALBUMIN SERPL-MCNC: 3.8 G/DL (ref 3.6–5.1)
ALBUMIN/GLOB SERPL: 1.3 (CALC) (ref 1–2.5)
ALP SERPL-CCNC: 41 U/L (ref 31–125)
ALT SERPL-CCNC: 10 U/L (ref 6–29)
APPEARANCE UR: CLEAR
AST SERPL-CCNC: 15 U/L (ref 10–35)
BACTERIA #/AREA URNS HPF: ABNORMAL /HPF
BACTERIA UR CULT: ABNORMAL
BASOPHILS # BLD AUTO: 81 CELLS/UL (ref 0–200)
BASOPHILS NFR BLD AUTO: 1.5 %
BILIRUB SERPL-MCNC: 0.5 MG/DL (ref 0.2–1.2)
BILIRUB UR QL STRIP: NEGATIVE
BUN SERPL-MCNC: 18 MG/DL (ref 7–25)
BUN/CREAT SERPL: NORMAL (CALC) (ref 6–22)
CALCIUM SERPL-MCNC: 8.6 MG/DL (ref 8.6–10.2)
CAOX CRY #/AREA URNS HPF: ABNORMAL /HPF
CHLORIDE SERPL-SCNC: 107 MMOL/L (ref 98–110)
CHOLEST SERPL-MCNC: 182 MG/DL
CHOLEST/HDLC SERPL: 3.4 (CALC)
CO2 SERPL-SCNC: 26 MMOL/L (ref 20–32)
COLOR UR: YELLOW
CREAT SERPL-MCNC: 0.77 MG/DL (ref 0.5–1.1)
EOSINOPHIL # BLD AUTO: 227 CELLS/UL (ref 15–500)
EOSINOPHIL NFR BLD AUTO: 4.2 %
ERYTHROCYTE [DISTWIDTH] IN BLOOD BY AUTOMATED COUNT: 12.3 % (ref 11–15)
GLOBULIN SER CALC-MCNC: 3 G/DL (CALC) (ref 1.9–3.7)
GLUCOSE SERPL-MCNC: 79 MG/DL (ref 65–99)
GLUCOSE UR QL STRIP: NEGATIVE
HCT VFR BLD AUTO: 35.8 % (ref 35–45)
HDLC SERPL-MCNC: 53 MG/DL
HGB BLD-MCNC: 11.5 G/DL (ref 11.7–15.5)
HGB UR QL STRIP: NEGATIVE
HYALINE CASTS #/AREA URNS LPF: ABNORMAL /LPF
KETONES UR QL STRIP: NEGATIVE
LDLC SERPL CALC-MCNC: 114 MG/DL (CALC)
LEUKOCYTE ESTERASE UR QL STRIP: NEGATIVE
LYMPHOCYTES # BLD AUTO: 1647 CELLS/UL (ref 850–3900)
LYMPHOCYTES NFR BLD AUTO: 30.5 %
MCH RBC QN AUTO: 28.5 PG (ref 27–33)
MCHC RBC AUTO-ENTMCNC: 32.1 G/DL (ref 32–36)
MCV RBC AUTO: 88.8 FL (ref 80–100)
MONOCYTES # BLD AUTO: 497 CELLS/UL (ref 200–950)
MONOCYTES NFR BLD AUTO: 9.2 %
NEUTROPHILS # BLD AUTO: 2948 CELLS/UL (ref 1500–7800)
NEUTROPHILS NFR BLD AUTO: 54.6 %
NITRITE UR QL STRIP: NEGATIVE
NONHDLC SERPL-MCNC: 129 MG/DL (CALC)
PH UR STRIP: 6 [PH] (ref 5–8)
PLATELET # BLD AUTO: 309 THOUSAND/UL (ref 140–400)
PMV BLD REES-ECKER: 11 FL (ref 7.5–12.5)
POTASSIUM SERPL-SCNC: 3.9 MMOL/L (ref 3.5–5.3)
PROT SERPL-MCNC: 6.8 G/DL (ref 6.1–8.1)
PROT UR QL STRIP: NEGATIVE
RBC # BLD AUTO: 4.03 MILLION/UL (ref 3.8–5.1)
RBC #/AREA URNS HPF: ABNORMAL /HPF
SODIUM SERPL-SCNC: 141 MMOL/L (ref 135–146)
SP GR UR STRIP: 1.02 (ref 1–1.03)
SQUAMOUS #/AREA URNS HPF: ABNORMAL /HPF
TRIGL SERPL-MCNC: 66 MG/DL
TSH SERPL-ACNC: 2.83 MIU/L
WBC # BLD AUTO: 5.4 THOUSAND/UL (ref 3.8–10.8)
WBC #/AREA URNS HPF: ABNORMAL /HPF

## 2022-05-04 ENCOUNTER — OFFICE VISIT (OUTPATIENT)
Dept: FAMILY MEDICINE | Facility: CLINIC | Age: 47
End: 2022-05-04
Payer: COMMERCIAL

## 2022-05-04 VITALS
HEART RATE: 68 BPM | HEIGHT: 60 IN | WEIGHT: 157 LBS | SYSTOLIC BLOOD PRESSURE: 118 MMHG | BODY MASS INDEX: 30.82 KG/M2 | DIASTOLIC BLOOD PRESSURE: 70 MMHG

## 2022-05-04 DIAGNOSIS — Z12.31 OTHER SCREENING MAMMOGRAM: ICD-10-CM

## 2022-05-04 DIAGNOSIS — K44.9 HIATAL HERNIA: ICD-10-CM

## 2022-05-04 DIAGNOSIS — I10 PRIMARY HYPERTENSION: Primary | ICD-10-CM

## 2022-05-04 DIAGNOSIS — Z12.11 COLON CANCER SCREENING: ICD-10-CM

## 2022-05-04 PROCEDURE — 99214 OFFICE O/P EST MOD 30 MIN: CPT | Mod: S$GLB,,, | Performed by: FAMILY MEDICINE

## 2022-05-04 PROCEDURE — 3078F PR MOST RECENT DIASTOLIC BLOOD PRESSURE < 80 MM HG: ICD-10-PCS | Mod: CPTII,S$GLB,, | Performed by: FAMILY MEDICINE

## 2022-05-04 PROCEDURE — 1160F PR REVIEW ALL MEDS BY PRESCRIBER/CLIN PHARMACIST DOCUMENTED: ICD-10-PCS | Mod: CPTII,S$GLB,, | Performed by: FAMILY MEDICINE

## 2022-05-04 PROCEDURE — 99214 PR OFFICE/OUTPT VISIT, EST, LEVL IV, 30-39 MIN: ICD-10-PCS | Mod: S$GLB,,, | Performed by: FAMILY MEDICINE

## 2022-05-04 PROCEDURE — 3074F SYST BP LT 130 MM HG: CPT | Mod: CPTII,S$GLB,, | Performed by: FAMILY MEDICINE

## 2022-05-04 PROCEDURE — 3008F BODY MASS INDEX DOCD: CPT | Mod: CPTII,S$GLB,, | Performed by: FAMILY MEDICINE

## 2022-05-04 PROCEDURE — 3074F PR MOST RECENT SYSTOLIC BLOOD PRESSURE < 130 MM HG: ICD-10-PCS | Mod: CPTII,S$GLB,, | Performed by: FAMILY MEDICINE

## 2022-05-04 PROCEDURE — 1160F RVW MEDS BY RX/DR IN RCRD: CPT | Mod: CPTII,S$GLB,, | Performed by: FAMILY MEDICINE

## 2022-05-04 PROCEDURE — 1159F PR MEDICATION LIST DOCUMENTED IN MEDICAL RECORD: ICD-10-PCS | Mod: CPTII,S$GLB,, | Performed by: FAMILY MEDICINE

## 2022-05-04 PROCEDURE — 1159F MED LIST DOCD IN RCRD: CPT | Mod: CPTII,S$GLB,, | Performed by: FAMILY MEDICINE

## 2022-05-04 PROCEDURE — 3078F DIAST BP <80 MM HG: CPT | Mod: CPTII,S$GLB,, | Performed by: FAMILY MEDICINE

## 2022-05-04 PROCEDURE — 3008F PR BODY MASS INDEX (BMI) DOCUMENTED: ICD-10-PCS | Mod: CPTII,S$GLB,, | Performed by: FAMILY MEDICINE

## 2022-05-04 NOTE — PROGRESS NOTES
SUBJECTIVE:    Patient ID: Aramis Rodarte is a 47 y.o. female.    Chief Complaint: Hypertension (Did not bring bottle//mammogram ordered//colonoscopy ordered//bs)    Pt here to checkup on acute and chronic conditions.    BP is doing ok.     Still working from home.  Works at [employer].    FMHx of breast cancer, mom with breast cancer in 30s. No genetic testing and no colon cancer.     Has been walking, up to 3 times a week.     Continues to have chest heaviness. Has a hiatal hernia. Is very uncomfortable. Takes prn otc nexium.    Still having bad migraines before menses.  Has nausea. Midol has been helping.     Had labs done: ,  ------------------------------------------------------------------------  Mammogram 5/2021, (Dr. Huber) 2 kids (20y and 18y)  Pap in August 2021 (Clavin), never abnl  Inquiring about cscope.      Hospital Outpatient Visit on 03/11/2022   Component Date Value Ref Range Status    AORTIC VALVE CUSP SEPERATION 03/11/2022 1.90  cm Final    AV mean gradient 03/11/2022 4  mmHg Final    Ao VTI 03/11/2022 29.10  cm Final    Ao peak gianfranco 03/11/2022 1.30  m/s Final    AV peak gradient 03/11/2022 7  mmHg Final    Ao root annulus 03/11/2022 2.90  cm Final    IVRT 03/11/2022 74.00  ms Final    IVS 03/11/2022 0.88  0.6 - 1.1 cm Final    LVIDd 03/11/2022 4.68  3.5 - 6.0 cm Final    LVIDs 03/11/2022 2.98  2.1 - 4.0 cm Final    LVOT diameter 03/11/2022 2.00  cm Final    LVOT peak VTI 03/11/2022 25.00  cm Final    LVOT peak gianfranco 03/11/2022 0.96  m/s Final    Posterior Wall 03/11/2022 0.86  0.6 - 1.1 cm Final    Left Atrium Major Axis 03/11/2022 3.20  cm Final    LA size 03/11/2022 3.60  cm Final    E wave deceleration time 03/11/2022 195.00  ms Final    MV Peak A Gianfranco 03/11/2022 0.75  m/s Final    MV Peak E Gianfranco 03/11/2022 0.85  m/s Final    RVDD 03/11/2022 2.14  cm Final    Triscuspid Valve Regurgitation Pea* 03/11/2022 23  mmHg Final    BSA 03/11/2022 1.73  m2  Final    TDI SEPTAL 03/11/2022 0.11  m/s Final    LV LATERAL E/E' RATIO 03/11/2022 6.54  m/s Final    LV SEPTAL E/E' RATIO 03/11/2022 7.73  m/s Final    TDI LATERAL 03/11/2022 0.13  m/s Final    FS 03/11/2022 36  28 - 44 % Final    LV mass 03/11/2022 135.47  g Final    Left Ventricle Relative Wall Thick* 03/11/2022 0.37  cm Final    AV valve area 03/11/2022 2.70  cm2 Final    AV Velocity Ratio 03/11/2022 0.74   Final    AV index (prosthetic) 03/11/2022 0.86   Final    E/A ratio 03/11/2022 1.13   Final    Mean e' 03/11/2022 0.12  m/s Final    LVOT area 03/11/2022 3.1  cm2 Final    LVOT stroke volume 03/11/2022 78.50  cm3 Final    E/E' ratio 03/11/2022 7.08  m/s Final    TR Max Gianfranco 03/11/2022 2.39  m/s Final    LV Mass Index 03/11/2022 81  g/m2 Final    EF 03/11/2022 65  % Final   Hospital Outpatient Visit on 03/11/2022   Component Date Value Ref Range Status    85% Max Predicted HR 03/11/2022 141   Final    Max Predicted HR 03/11/2022 166   Final    OHS CV CPX PATIENT IS MALE 03/11/2022 0.0   Final    OHS CV CPX PATIENT IS FEMALE 03/11/2022 1.0   Final    EF + QEF 03/11/2022 53  % Final    Ejection Fraction- High Stress 03/11/2022 65  % Final    End diastolic index (mL/m2) 03/11/2022 94  mL/m2 Final    End diastolic index (mL/m2) 03/11/2022 158  mL/m2 Final    End systolic index (mL/m2) 03/11/2022 33  mL/m2 Final    End systolic index (mL/m2) 03/11/2022 71  mL/m2 Final    Nuc Stress EF 03/11/2022 69  % Final    Nuc Rest Diastolic Volume Index 03/11/2022 64   Final    Nuc Rest Systolic Volume Index 03/11/2022 20   Final    HR at rest 03/11/2022 65  bpm Final    Systolic blood pressure 03/11/2022 128  mmHg Final    Diastolic blood pressure 03/11/2022 80  mmHg Final    RPP 03/11/2022 8,320   Final    Exercise duration (min) 03/11/2022 7  minutes Final    Exercise duration (sec) 03/11/2022 43  seconds Final    Peak HR 03/11/2022 161  bpm Final    Peak Systolic BP 03/11/2022 184   "mmHg Final    Peak Diatolic BP 03/11/2022 94  mmHg Final    Peak RPP 03/11/2022 29,624   Final    Estimated METs 03/11/2022 10   Final    % Max HR Achieved 03/11/2022 97   Final    1 Minute Recovery HR 03/11/2022 141  bpm Final    ST Depression (mm) 03/11/2022 0.7  mm Final       Past Medical History:   Diagnosis Date    GERD (gastroesophageal reflux disease)     Hiatal hernia      Social History     Socioeconomic History    Marital status: Single   Tobacco Use    Smoking status: Never Smoker    Smokeless tobacco: Never Used   Substance and Sexual Activity    Alcohol use: Yes     Alcohol/week: 0.0 standard drinks     Comment: occasional    Drug use: No   Social History Narrative    Active but no exercise    Eating habits "ok"     Past Surgical History:   Procedure Laterality Date    NO PAST SURGERIES       Family History   Problem Relation Age of Onset    Breast cancer Mother     Diabetes Maternal Aunt     Hypertension Maternal Aunt     Diabetes Unknown         multiple     Coronary artery disease Maternal Grandmother         non premature    Valvular heart disease Father     Colon cancer Neg Hx        Review of patient's allergies indicates:  No Known Allergies    Current Outpatient Medications:     amLODIPine (NORVASC) 2.5 MG tablet, Take 2 tablets (5 mg total) by mouth once daily., Disp: 180 tablet, Rfl: 3    SPRINTEC, 28, 0.25-35 mg-mcg per tablet, Take 1 tablet by mouth once daily., Disp: , Rfl:     Review of Systems   Constitutional: Negative for appetite change, fatigue, fever and unexpected weight change.   Respiratory: Negative for cough, chest tightness, shortness of breath and wheezing.    Cardiovascular: Positive for chest pain. Negative for leg swelling.   Gastrointestinal: Negative for abdominal pain, constipation, nausea and vomiting.        -heartburn   Genitourinary: Negative for difficulty urinating, dysuria, frequency and urgency.   Musculoskeletal: Negative for arthralgias, " back pain, myalgias and neck pain.   Skin: Negative for rash.   Neurological: Negative for dizziness, weakness, numbness and headaches.   Hematological: Does not bruise/bleed easily.   Psychiatric/Behavioral: Negative for dysphoric mood, sleep disturbance and suicidal ideas. The patient is not nervous/anxious.    All other systems reviewed and are negative.         Objective:      Vitals:    05/04/22 1538   BP: 118/70   Pulse: 68   Weight: 71.2 kg (157 lb)   Height: 5' (1.524 m)     Physical Exam  Vitals reviewed.   Constitutional:       General: She is not in acute distress.     Appearance: Normal appearance. She is well-developed and overweight.   HENT:      Head: Normocephalic and atraumatic.   Neck:      Thyroid: No thyromegaly.   Cardiovascular:      Rate and Rhythm: Normal rate and regular rhythm.      Heart sounds: Normal heart sounds. No murmur heard.    No friction rub.   Pulmonary:      Effort: Pulmonary effort is normal.      Breath sounds: Normal breath sounds. No wheezing or rales.   Abdominal:      General: Bowel sounds are normal. There is no distension.      Palpations: Abdomen is soft.      Tenderness: There is no abdominal tenderness.   Musculoskeletal:      Cervical back: Neck supple.   Lymphadenopathy:      Cervical: No cervical adenopathy.   Skin:     General: Skin is warm and dry.      Findings: No rash.   Neurological:      Mental Status: She is alert and oriented to person, place, and time.   Psychiatric:         Attention and Perception: She is attentive.         Speech: Speech normal.         Behavior: Behavior normal.         Thought Content: Thought content normal.         Judgment: Judgment normal.           Assessment:       1. Primary hypertension    2. Hiatal hernia    3. Other screening mammogram    4. Colon cancer screening         Plan:       Primary hypertension  Comments:  Controlled. Will continue to monitor BP on current medication regimen. To keep BP log as attempts to wean  off medication     Hiatal hernia  Comments:  Controlled. To continue to follow with otc nexium prn    Other screening mammogram  Comments:  Mammogram order [facility] to [facility]  Orders:  -     Mammo Digital Screening Bilat; Future; Expected date: 05/04/2022    Colon cancer screening  Comments:  Will refer to GI.  Orders:  -     Ambulatory referral/consult to Gastroenterology; Future; Expected date: 05/11/2022    Other  Lab results discussed and reviewed with patient.    Follow up in about 3 months (around 8/4/2022) for HTN, hernia, .        5/4/2022 Tho Alfonso

## 2022-05-16 ENCOUNTER — HOSPITAL ENCOUNTER (OUTPATIENT)
Dept: RADIOLOGY | Facility: HOSPITAL | Age: 47
Discharge: HOME | End: 2022-05-16
Attending: FAMILY MEDICINE
Payer: COMMERCIAL

## 2022-05-16 VITALS — WEIGHT: 156.94 LBS | BODY MASS INDEX: 30.81 KG/M2 | HEIGHT: 60 IN

## 2022-05-16 DIAGNOSIS — Z12.31 OTHER SCREENING MAMMOGRAM: ICD-10-CM

## 2022-05-16 PROCEDURE — 77067 SCR MAMMO BI INCL CAD: CPT | Mod: TC,PO

## 2022-05-16 PROCEDURE — 77063 BREAST TOMOSYNTHESIS BI: CPT | Mod: TC,PO

## 2022-07-20 ENCOUNTER — OFFICE VISIT (OUTPATIENT)
Dept: CARDIOLOGY | Facility: CLINIC | Age: 47
End: 2022-07-20
Payer: COMMERCIAL

## 2022-07-20 VITALS
WEIGHT: 156.5 LBS | BODY MASS INDEX: 30.73 KG/M2 | DIASTOLIC BLOOD PRESSURE: 68 MMHG | HEIGHT: 60 IN | RESPIRATION RATE: 16 BRPM | SYSTOLIC BLOOD PRESSURE: 120 MMHG | HEART RATE: 75 BPM | OXYGEN SATURATION: 98 %

## 2022-07-20 DIAGNOSIS — I10 HYPERTENSIVE RESPONSE TO EXERCISE: ICD-10-CM

## 2022-07-20 DIAGNOSIS — E78.2 MIXED HYPERLIPIDEMIA: ICD-10-CM

## 2022-07-20 DIAGNOSIS — R07.9 CHEST PAIN, UNSPECIFIED TYPE: ICD-10-CM

## 2022-07-20 PROCEDURE — 1160F PR REVIEW ALL MEDS BY PRESCRIBER/CLIN PHARMACIST DOCUMENTED: ICD-10-PCS | Mod: CPTII,S$GLB,,

## 2022-07-20 PROCEDURE — 3008F PR BODY MASS INDEX (BMI) DOCUMENTED: ICD-10-PCS | Mod: CPTII,S$GLB,,

## 2022-07-20 PROCEDURE — 3074F PR MOST RECENT SYSTOLIC BLOOD PRESSURE < 130 MM HG: ICD-10-PCS | Mod: CPTII,S$GLB,,

## 2022-07-20 PROCEDURE — 1159F PR MEDICATION LIST DOCUMENTED IN MEDICAL RECORD: ICD-10-PCS | Mod: CPTII,S$GLB,,

## 2022-07-20 PROCEDURE — 99214 PR OFFICE/OUTPT VISIT, EST, LEVL IV, 30-39 MIN: ICD-10-PCS | Mod: S$GLB,,,

## 2022-07-20 PROCEDURE — 3074F SYST BP LT 130 MM HG: CPT | Mod: CPTII,S$GLB,,

## 2022-07-20 PROCEDURE — 99214 OFFICE O/P EST MOD 30 MIN: CPT | Mod: S$GLB,,,

## 2022-07-20 PROCEDURE — 3078F PR MOST RECENT DIASTOLIC BLOOD PRESSURE < 80 MM HG: ICD-10-PCS | Mod: CPTII,S$GLB,,

## 2022-07-20 PROCEDURE — 1159F MED LIST DOCD IN RCRD: CPT | Mod: CPTII,S$GLB,,

## 2022-07-20 PROCEDURE — 3078F DIAST BP <80 MM HG: CPT | Mod: CPTII,S$GLB,,

## 2022-07-20 PROCEDURE — 3008F BODY MASS INDEX DOCD: CPT | Mod: CPTII,S$GLB,,

## 2022-07-20 PROCEDURE — 1160F RVW MEDS BY RX/DR IN RCRD: CPT | Mod: CPTII,S$GLB,,

## 2022-07-20 NOTE — ASSESSMENT & PLAN NOTE
Continue amlodipine at present doing 2.5 mg BID  NO LE edema reported   Continue to walk in evenings after work

## 2022-07-20 NOTE — PROGRESS NOTES
Subjective:    Patient ID:  Aramis Rodarte is a 47 y.o. female patient here for evaluation Follow-up (4 mo fu)      History of Present Illness:     Patient is here today for a follow up. She has been feeling fine. She does have a hitaial hernia that causes epigastric discomfort. She has no chest pain, dizziness, syncope, falls, palpitations, neck/arm/jaw pain, edema, Ha.   She states her BP has been okay.         Review of patient's allergies indicates:  No Known Allergies    Past Medical History:   Diagnosis Date    GERD (gastroesophageal reflux disease)     Hiatal hernia      Past Surgical History:   Procedure Laterality Date    NO PAST SURGERIES       Social History     Tobacco Use    Smoking status: Never Smoker    Smokeless tobacco: Never Used   Substance Use Topics    Alcohol use: Yes     Alcohol/week: 0.0 standard drinks     Comment: occasional    Drug use: No        Review of Systems:    As noted in HPI in addition      REVIEW OF SYSTEMS  CARDIOVASCULAR: No recent chest pain, palpitations, arm, neck, or jaw pain  RESPIRATORY: No recent fever, cough chills, SOB or congestion  : No blood in the urine  GI: No Nausea, vomiting, constipation, diarrhea, blood, or reflux.  MUSCULOSKELETAL: No myalgias  NEURO: No lightheadedness or dizziness  EYES: No Double vision, blurry, vision or headache              Objective        Vitals:    07/20/22 1446   BP: 120/68   Pulse: 75   Resp: 16       LIPIDS - LAST 2   Lab Results   Component Value Date    CHOL 182 04/30/2022    CHOL 199 01/02/2021    HDL 53 04/30/2022    HDL 58 01/02/2021    LDLCALC 114 (H) 04/30/2022    LDLCALC 123 (H) 01/02/2021    TRIG 66 04/30/2022    TRIG 79 01/02/2021    CHOLHDL 3.4 04/30/2022    CHOLHDL 3.4 01/02/2021       CBC - LAST 2  Lab Results   Component Value Date    WBC 5.4 04/30/2022    WBC 5.7 01/02/2021    RBC 4.03 04/30/2022    RBC 4.37 01/02/2021    HGB 11.5 (L) 04/30/2022    HGB 12.3 01/02/2021    HCT 35.8 04/30/2022    HCT 38.7  01/02/2021    MCV 88.8 04/30/2022    MCV 88.6 01/02/2021    MCH 28.5 04/30/2022    MCH 28.1 01/02/2021    MCHC 32.1 04/30/2022    MCHC 31.8 (L) 01/02/2021    RDW 12.3 04/30/2022    RDW 11.9 01/02/2021     04/30/2022     01/02/2021    MPV 11.0 04/30/2022    MPV 11.6 01/02/2021    GRAN 2.4 01/25/2020    GRAN 55.5 01/25/2020    LYMPH 1,647 04/30/2022    LYMPH 30.5 04/30/2022    MONO 497 04/30/2022    MONO 9.2 04/30/2022    BASO 81 04/30/2022    BASO 103 01/02/2021       CHEMISTRY & LIVER FUNCTION - LAST 2  Lab Results   Component Value Date     04/30/2022     01/02/2021    K 3.9 04/30/2022    K 4.5 01/02/2021     04/30/2022     01/02/2021    CO2 26 04/30/2022    CO2 23 01/02/2021    ANIONGAP 8 01/25/2020    ANIONGAP 7 (L) 02/12/2018    BUN 18 04/30/2022    BUN 13 01/02/2021    CREATININE 0.77 04/30/2022    CREATININE 0.89 01/02/2021    GLU 79 04/30/2022    GLU 86 01/02/2021    CALCIUM 8.6 04/30/2022    CALCIUM 8.9 01/02/2021    ALBUMIN 3.8 04/30/2022    ALBUMIN 3.9 01/02/2021    PROT 6.8 04/30/2022    PROT 7.0 01/02/2021    ALKPHOS 58 01/25/2020    ALKPHOS 67 02/12/2018    ALT 10 04/30/2022    ALT 10 01/02/2021    AST 15 04/30/2022    AST 17 01/02/2021    BILITOT 0.5 04/30/2022    BILITOT 0.6 01/02/2021        CARDIAC PROFILE - LAST 2  No results found for: BNP, CPK, CPKMB, LDH, TROPONINI     COAGULATION - LAST 2  No results found for: LABPT, INR, APTT    ENDOCRINE & PSA - LAST 2  Lab Results   Component Value Date    HGBA1C 5.0 01/25/2020    HGBA1C 4.8 02/12/2018    TSH 0.729 01/25/2020    TSH 2.056 02/12/2018        ECHOCARDIOGRAM RESULTS  Results for orders placed during the hospital encounter of 03/11/22    Echo    Interpretation Summary  · The left ventricle is normal in size with normal systolic function.  · The estimated ejection fraction is 65%.  · Normal left ventricular diastolic function.  · Normal right ventricular size with normal right ventricular systolic  function.  · Mild mitral regurgitation.  · Mild to moderate tricuspid regurgitation.      CURRENT/PREVIOUS VISIT EKG  Results for orders placed or performed in visit on 02/01/22   IN OFFICE EKG 12-LEAD (to [place])    Collection Time: 02/01/22  4:50 PM    Narrative    Test Reason : R07.89,    Vent. Rate : 071 BPM     Atrial Rate : 071 BPM     P-R Int : 122 ms          QRS Dur : 072 ms      QT Int : 406 ms       P-R-T Axes : 049 028 028 degrees     QTc Int : 441 ms    Program found technically poor ECG  Normal sinus rhythm  Normal ECG  When compared with ECG of 30-MAR-2016 11:10,  No significant change was found  Confirmed by Jeronimo Zaragoza MD ([id number]) on 2/27/2022 9:17:56 PM    Referred By: CHRISTINE ARCEO           Confirmed By:Jeronimo Zaragoza MD     No valid procedures specified.   Results for orders placed during the hospital encounter of 03/11/22    Nuclear Stress - Cardiology Interpreted    Interpretation Summary    Normal myocardial perfusion scan. There is no evidence of myocardial ischemia or infarction.    The gated perfusion images showed an ejection fraction of 69% post stress. Normal ejection fraction is greater than 53%.    There is normal wall motion at rest and post stress.    LV cavity size is normal at rest and normal at stress.    The EKG portion of this study is negative for ischemia.    The patient reported no chest pain during the stress test.    Patient exercised on a Andrew protocol for 7 minutes and 43 seconds and attained a maximum heart rate of 161 beats per minute which is 92% of the maximum predicted heart rate and attained 10.1 METS .  Patient had hypertensive response to exercise.    No valid procedures specified.    PHYSICAL EXAM  CONSTITUTIONAL: Well built, well nourished in no apparent distress  NECK: no carotid bruit, no JVD  LUNGS: CTA  CHEST WALL: no tenderness  HEART: regular rate and rhythm, S1, S2 normal, no murmur, click, rub or gallop   ABDOMEN: soft, non-tender; bowel sounds  normal; no masses,  no organomegaly  EXTREMITIES: Extremities normal, no edema, no calf tenderness noted  NEURO: AAO X 3    I HAVE REVIEWED :    The vital signs, nurses notes, and all the pertinent radiology and labs.        Current Outpatient Medications   Medication Instructions    amLODIPine (NORVASC) 5 mg, Oral, Daily    SPRINTEC, 28, 0.25-35 mg-mcg per tablet 1 tablet, Oral, Daily          Assessment & Plan     Chest pain  Resolved   Has a hiatial hernia     Mixed hyperlipidemia     Cont dietary control at this time     Hypertensive response to exercise  Continue amlodipine at present doing 2.5 mg BID  NO LE edema reported   Continue to walk in evenings after work           Follow up in about 1 year (around 7/20/2023).

## 2022-08-10 ENCOUNTER — OFFICE VISIT (OUTPATIENT)
Dept: FAMILY MEDICINE | Facility: CLINIC | Age: 47
End: 2022-08-10
Payer: COMMERCIAL

## 2022-08-10 VITALS
SYSTOLIC BLOOD PRESSURE: 118 MMHG | DIASTOLIC BLOOD PRESSURE: 78 MMHG | BODY MASS INDEX: 30.82 KG/M2 | HEIGHT: 60 IN | HEART RATE: 72 BPM | WEIGHT: 157 LBS | OXYGEN SATURATION: 99 %

## 2022-08-10 DIAGNOSIS — I10 PRIMARY HYPERTENSION: Primary | ICD-10-CM

## 2022-08-10 DIAGNOSIS — K44.9 HIATAL HERNIA: ICD-10-CM

## 2022-08-10 DIAGNOSIS — G43.829 MENSTRUAL MIGRAINE WITHOUT STATUS MIGRAINOSUS, NOT INTRACTABLE: ICD-10-CM

## 2022-08-10 PROCEDURE — 1160F RVW MEDS BY RX/DR IN RCRD: CPT | Mod: CPTII,S$GLB,, | Performed by: FAMILY MEDICINE

## 2022-08-10 PROCEDURE — 99214 OFFICE O/P EST MOD 30 MIN: CPT | Mod: S$GLB,,, | Performed by: FAMILY MEDICINE

## 2022-08-10 PROCEDURE — 3008F PR BODY MASS INDEX (BMI) DOCUMENTED: ICD-10-PCS | Mod: CPTII,S$GLB,, | Performed by: FAMILY MEDICINE

## 2022-08-10 PROCEDURE — 1159F MED LIST DOCD IN RCRD: CPT | Mod: CPTII,S$GLB,, | Performed by: FAMILY MEDICINE

## 2022-08-10 PROCEDURE — 3078F PR MOST RECENT DIASTOLIC BLOOD PRESSURE < 80 MM HG: ICD-10-PCS | Mod: CPTII,S$GLB,, | Performed by: FAMILY MEDICINE

## 2022-08-10 PROCEDURE — 3074F SYST BP LT 130 MM HG: CPT | Mod: CPTII,S$GLB,, | Performed by: FAMILY MEDICINE

## 2022-08-10 PROCEDURE — 3078F DIAST BP <80 MM HG: CPT | Mod: CPTII,S$GLB,, | Performed by: FAMILY MEDICINE

## 2022-08-10 PROCEDURE — 1160F PR REVIEW ALL MEDS BY PRESCRIBER/CLIN PHARMACIST DOCUMENTED: ICD-10-PCS | Mod: CPTII,S$GLB,, | Performed by: FAMILY MEDICINE

## 2022-08-10 PROCEDURE — 99214 PR OFFICE/OUTPT VISIT, EST, LEVL IV, 30-39 MIN: ICD-10-PCS | Mod: S$GLB,,, | Performed by: FAMILY MEDICINE

## 2022-08-10 PROCEDURE — 3008F BODY MASS INDEX DOCD: CPT | Mod: CPTII,S$GLB,, | Performed by: FAMILY MEDICINE

## 2022-08-10 PROCEDURE — 1159F PR MEDICATION LIST DOCUMENTED IN MEDICAL RECORD: ICD-10-PCS | Mod: CPTII,S$GLB,, | Performed by: FAMILY MEDICINE

## 2022-08-10 PROCEDURE — 3074F PR MOST RECENT SYSTOLIC BLOOD PRESSURE < 130 MM HG: ICD-10-PCS | Mod: CPTII,S$GLB,, | Performed by: FAMILY MEDICINE

## 2022-08-10 RX ORDER — NORGESTIMATE AND ETHINYL ESTRADIOL 7DAYSX3 28
1 KIT ORAL DAILY
COMMUNITY
Start: 2022-06-11

## 2022-08-10 NOTE — PROGRESS NOTES
"  SUBJECTIVE:    Patient ID: Aramis Rodarte is a 47 y.o. female.    Chief Complaint: Hypertension (3mth, went over meds verbally// SW)    Pt here to checkup on acute and chronic conditions.    BP is doing ok.     Still working from home.  Works at [employer].    FMHx of breast cancer, mom with breast cancer in 30s. No genetic testing and no colon cancer.     Has not bee walking daily due to the rain.    Continues to have chest heaviness.  Has not gotten worse. Has a hiatal hernia. Is very uncomfortable. Takes prn otc nexium.    Migraines before menses have improved. Takes midol as needed.    No recent labs.  ------------------------------------------------------------------------  Mammogram 6/2022, (Dr. Huber) 2 kids (20y and 18y)  Pap in August 2021 (Clavin), never abnl  Cscope 6/2022, to repeat in 10 years.          Past Medical History:   Diagnosis Date    GERD (gastroesophageal reflux disease)     Hiatal hernia      Social History     Socioeconomic History    Marital status: Single   Tobacco Use    Smoking status: Never Smoker    Smokeless tobacco: Never Used   Substance and Sexual Activity    Alcohol use: Yes     Alcohol/week: 0.0 standard drinks     Comment: occasional    Drug use: No   Social History Narrative    Active but no exercise    Eating habits "ok"     Past Surgical History:   Procedure Laterality Date    NO PAST SURGERIES       Family History   Problem Relation Age of Onset    Breast cancer Mother     Diabetes Maternal Aunt     Hypertension Maternal Aunt     Diabetes Unknown         multiple     Coronary artery disease Maternal Grandmother         non premature    Valvular heart disease Father     Colon cancer Neg Hx        Review of patient's allergies indicates:  No Known Allergies    Current Outpatient Medications:     amLODIPine (NORVASC) 2.5 MG tablet, Take 2 tablets (5 mg total) by mouth once daily., Disp: 180 tablet, Rfl: 3    TRI-SPRINTEC, 28, " 0.18/0.215/0.25 mg-35 mcg (28) tablet, Take 1 tablet by mouth once daily., Disp: , Rfl:     Review of Systems   Constitutional: Negative for appetite change, fatigue, fever and unexpected weight change.   Respiratory: Negative for cough, chest tightness, shortness of breath and wheezing.    Cardiovascular: Positive for chest pain. Negative for leg swelling.   Gastrointestinal: Negative for abdominal pain, constipation, nausea and vomiting.        -heartburn   Genitourinary: Negative for difficulty urinating, dysuria, frequency and urgency.   Musculoskeletal: Negative for arthralgias, back pain, myalgias and neck pain.   Skin: Negative for rash.   Neurological: Negative for dizziness, weakness, numbness and headaches.   Hematological: Does not bruise/bleed easily.   Psychiatric/Behavioral: Negative for dysphoric mood, sleep disturbance and suicidal ideas. The patient is not nervous/anxious.    All other systems reviewed and are negative.         Objective:      Vitals:    08/10/22 1557   BP: 118/78   Pulse: 72   SpO2: 99%   Weight: 71.2 kg (157 lb)   Height: 5' (1.524 m)     Physical Exam  Vitals reviewed.   Constitutional:       General: She is not in acute distress.     Appearance: Normal appearance. She is well-developed and overweight.   HENT:      Head: Normocephalic and atraumatic.   Neck:      Thyroid: No thyromegaly.   Cardiovascular:      Rate and Rhythm: Normal rate and regular rhythm.      Heart sounds: Normal heart sounds. No murmur heard.    No friction rub.   Pulmonary:      Effort: Pulmonary effort is normal.      Breath sounds: Normal breath sounds. No wheezing or rales.   Abdominal:      General: Bowel sounds are normal. There is no distension.      Palpations: Abdomen is soft.      Tenderness: There is no abdominal tenderness.   Musculoskeletal:      Cervical back: Neck supple.   Lymphadenopathy:      Cervical: No cervical adenopathy.   Skin:     General: Skin is warm and dry.      Findings: No  rash.   Neurological:      Mental Status: She is alert and oriented to person, place, and time.   Psychiatric:         Attention and Perception: She is attentive.         Speech: Speech normal.         Behavior: Behavior normal.         Thought Content: Thought content normal.         Judgment: Judgment normal.           Assessment:       1. Primary hypertension    2. Hiatal hernia    3. Menstrual migraine without status migrainosus, not intractable         Plan:       Primary hypertension  Comments:  Controlled. Will continue to monitor BP on current medication regimen    Hiatal hernia  Comments:  Controlled. Will continue to monitor symptoms prn    Menstrual migraine without status migrainosus, not intractable  Comments:  Controlled. Will continue to monitor symptoms.       Follow up in about 4 months (around 12/10/2022) for HTN.        8/10/2022 Tho Alfonso

## 2022-09-22 ENCOUNTER — TELEPHONE (OUTPATIENT)
Dept: FAMILY MEDICINE | Facility: CLINIC | Age: 47
End: 2022-09-22

## 2022-09-22 NOTE — TELEPHONE ENCOUNTER
----- Message from Chelsea Alva sent at 9/22/2022  3:07 PM CDT -----  Patient called and want to know if she had the pfizer Covid shot do she have to get the same booster or can she get another brand of the booster please give her a call at [phone]

## 2023-01-11 ENCOUNTER — OFFICE VISIT (OUTPATIENT)
Dept: FAMILY MEDICINE | Facility: CLINIC | Age: 48
End: 2023-01-11
Payer: COMMERCIAL

## 2023-01-11 VITALS
WEIGHT: 153.38 LBS | HEART RATE: 66 BPM | OXYGEN SATURATION: 99 % | SYSTOLIC BLOOD PRESSURE: 114 MMHG | BODY MASS INDEX: 30.11 KG/M2 | DIASTOLIC BLOOD PRESSURE: 70 MMHG | HEIGHT: 60 IN

## 2023-01-11 DIAGNOSIS — I10 PRIMARY HYPERTENSION: Primary | ICD-10-CM

## 2023-01-11 DIAGNOSIS — G43.829 MENSTRUAL MIGRAINE WITHOUT STATUS MIGRAINOSUS, NOT INTRACTABLE: ICD-10-CM

## 2023-01-11 DIAGNOSIS — Z13.89 SCREENING FOR BLOOD OR PROTEIN IN URINE: ICD-10-CM

## 2023-01-11 DIAGNOSIS — Z79.899 LONG-TERM USE OF HIGH-RISK MEDICATION: ICD-10-CM

## 2023-01-11 DIAGNOSIS — Z13.29 SCREENING FOR ENDOCRINE DISORDER: ICD-10-CM

## 2023-01-11 DIAGNOSIS — Z13.6 SCREENING FOR ISCHEMIC HEART DISEASE (IHD): ICD-10-CM

## 2023-01-11 PROCEDURE — 1159F PR MEDICATION LIST DOCUMENTED IN MEDICAL RECORD: ICD-10-PCS | Mod: CPTII,S$GLB,, | Performed by: FAMILY MEDICINE

## 2023-01-11 PROCEDURE — 99214 OFFICE O/P EST MOD 30 MIN: CPT | Mod: S$GLB,,, | Performed by: FAMILY MEDICINE

## 2023-01-11 PROCEDURE — 3078F DIAST BP <80 MM HG: CPT | Mod: CPTII,S$GLB,, | Performed by: FAMILY MEDICINE

## 2023-01-11 PROCEDURE — 3008F PR BODY MASS INDEX (BMI) DOCUMENTED: ICD-10-PCS | Mod: CPTII,S$GLB,, | Performed by: FAMILY MEDICINE

## 2023-01-11 PROCEDURE — 1159F MED LIST DOCD IN RCRD: CPT | Mod: CPTII,S$GLB,, | Performed by: FAMILY MEDICINE

## 2023-01-11 PROCEDURE — 3074F PR MOST RECENT SYSTOLIC BLOOD PRESSURE < 130 MM HG: ICD-10-PCS | Mod: CPTII,S$GLB,, | Performed by: FAMILY MEDICINE

## 2023-01-11 PROCEDURE — 3008F BODY MASS INDEX DOCD: CPT | Mod: CPTII,S$GLB,, | Performed by: FAMILY MEDICINE

## 2023-01-11 PROCEDURE — 99214 PR OFFICE/OUTPT VISIT, EST, LEVL IV, 30-39 MIN: ICD-10-PCS | Mod: S$GLB,,, | Performed by: FAMILY MEDICINE

## 2023-01-11 PROCEDURE — 3078F PR MOST RECENT DIASTOLIC BLOOD PRESSURE < 80 MM HG: ICD-10-PCS | Mod: CPTII,S$GLB,, | Performed by: FAMILY MEDICINE

## 2023-01-11 PROCEDURE — 1160F PR REVIEW ALL MEDS BY PRESCRIBER/CLIN PHARMACIST DOCUMENTED: ICD-10-PCS | Mod: CPTII,S$GLB,, | Performed by: FAMILY MEDICINE

## 2023-01-11 PROCEDURE — 3074F SYST BP LT 130 MM HG: CPT | Mod: CPTII,S$GLB,, | Performed by: FAMILY MEDICINE

## 2023-01-11 PROCEDURE — 1160F RVW MEDS BY RX/DR IN RCRD: CPT | Mod: CPTII,S$GLB,, | Performed by: FAMILY MEDICINE

## 2023-01-11 RX ORDER — AMLODIPINE BESYLATE 2.5 MG/1
2.5 TABLET ORAL 2 TIMES DAILY
Qty: 180 TABLET | Refills: 3
Start: 2023-01-11 | End: 2023-03-20 | Stop reason: SDUPTHER

## 2023-01-11 NOTE — PROGRESS NOTES
"  SUBJECTIVE:    Patient ID: Aramis Rodarte is a 47 y.o. female.    Chief Complaint: Follow-up (Brought bottles//dp)    Pt here to checkup on acute and chronic conditions.    BP is doing ok.     Still working from home.  Works at [employer].    FMHx of breast cancer, mom with breast cancer in 30s. No genetic testing and no colon cancer.     Has been exercising at home with light weight.     Continues to have chest heaviness.  Has not gotten worse. Has a hiatal hernia. Is very uncomfortable. Has not taken nexium in a while as she doesn't think it helps.     Migraines before menses have improved. Takes midol as needed. Her GYN recommended BTL  as a method for bcp.    No recent labs.  ------------------------------------------------------------------------  Mammogram 6/2022, (Dr. Huber) 2 kids (20y and 18y)  Pap in August 2021 (Clavin), never abnl  Cscope 6/2022, to repeat in 10 years.          Past Medical History:   Diagnosis Date    GERD (gastroesophageal reflux disease)     Hiatal hernia      Social History     Socioeconomic History    Marital status: Single   Tobacco Use    Smoking status: Never    Smokeless tobacco: Never   Substance and Sexual Activity    Alcohol use: Yes     Alcohol/week: 0.0 standard drinks     Comment: occasional    Drug use: No   Social History Narrative    Active but no exercise    Eating habits "ok"     Past Surgical History:   Procedure Laterality Date    NO PAST SURGERIES       Family History   Problem Relation Age of Onset    Breast cancer Mother     Diabetes Maternal Aunt     Hypertension Maternal Aunt     Diabetes Unknown         multiple     Coronary artery disease Maternal Grandmother         non premature    Valvular heart disease Father     Colon cancer Neg Hx        Review of patient's allergies indicates:  No Known Allergies    Current Outpatient Medications:     TRI-SPRINTEC, 28, 0.18/0.215/0.25 mg-35 mcg (28) tablet, Take 1 tablet by mouth once daily., Disp: , " Rfl:     amLODIPine (NORVASC) 2.5 MG tablet, Take 1 tablet (2.5 mg total) by mouth 2 (two) times daily., Disp: 180 tablet, Rfl: 3    Review of Systems   Constitutional:  Negative for appetite change, fatigue, fever and unexpected weight change.   Respiratory:  Negative for cough, chest tightness, shortness of breath and wheezing.    Cardiovascular:  Positive for chest pain. Negative for leg swelling.   Gastrointestinal:  Negative for abdominal pain, constipation, nausea and vomiting.        -heartburn   Genitourinary:  Negative for difficulty urinating, dysuria, frequency and urgency.   Musculoskeletal:  Negative for arthralgias, back pain, myalgias and neck pain.   Skin:  Negative for rash.   Neurological:  Negative for dizziness, weakness, numbness and headaches.   Hematological:  Does not bruise/bleed easily.   Psychiatric/Behavioral:  Negative for dysphoric mood, sleep disturbance and suicidal ideas. The patient is not nervous/anxious.    All other systems reviewed and are negative.       Objective:      Vitals:    01/11/23 1646   BP: 114/70   Pulse: 66   SpO2: 99%   Weight: 69.6 kg (153 lb 6.4 oz)   Height: 5' (1.524 m)     Wt Readings from Last 3 Encounters:   01/11/23 69.6 kg (153 lb 6.4 oz)   08/10/22 71.2 kg (157 lb)   07/20/22 71 kg (156 lb 8.4 oz)       Physical Exam  Vitals reviewed.   Constitutional:       General: She is not in acute distress.     Appearance: Normal appearance. She is well-developed and overweight.   HENT:      Head: Normocephalic and atraumatic.   Neck:      Thyroid: No thyromegaly.   Cardiovascular:      Rate and Rhythm: Normal rate and regular rhythm.      Heart sounds: Normal heart sounds. No murmur heard.    No friction rub.   Pulmonary:      Effort: Pulmonary effort is normal.      Breath sounds: Normal breath sounds. No wheezing or rales.   Abdominal:      General: Bowel sounds are normal. There is no distension.      Palpations: Abdomen is soft.      Tenderness: There is no  abdominal tenderness.   Musculoskeletal:      Cervical back: Neck supple.   Lymphadenopathy:      Cervical: No cervical adenopathy.   Skin:     General: Skin is warm and dry.      Findings: No rash.   Neurological:      Mental Status: She is alert and oriented to person, place, and time.   Psychiatric:         Attention and Perception: She is attentive.         Speech: Speech normal.         Behavior: Behavior normal.         Thought Content: Thought content normal.         Judgment: Judgment normal.         Assessment:       1. Primary hypertension    2. Menstrual migraine without status migrainosus, not intractable    3. Long-term use of high-risk medication    4. Screening for ischemic heart disease (IHD)    5. Screening for blood or protein in urine    6. Screening for endocrine disorder         Plan:       Primary hypertension  Comments:  Controlled. Will continue to monitor BP on current medication regimen  Orders:  -     Lipid Panel; Future; Expected date: 01/11/2023  -     Comprehensive Metabolic Panel; Future; Expected date: 01/11/2023  -     Microalbumin/Creatinine Ratio, Urine; Future; Expected date: 01/11/2023  -     TSH w/reflex to FT4; Future; Expected date: 01/11/2023  -     Urinalysis, Reflex to Urine Culture Urine, Clean Catch; Future; Expected date: 01/11/2023  -     CBC Auto Differential; Future; Expected date: 01/11/2023  -     amLODIPine (NORVASC) 2.5 MG tablet; Take 1 tablet (2.5 mg total) by mouth 2 (two) times daily.  Dispense: 180 tablet; Refill: 3    Menstrual migraine without status migrainosus, not intractable  Comments:  Controlled. Will continue to monitor symptoms    Long-term use of high-risk medication  -     Lipid Panel; Future; Expected date: 01/11/2023  -     Comprehensive Metabolic Panel; Future; Expected date: 01/11/2023  -     Microalbumin/Creatinine Ratio, Urine; Future; Expected date: 01/11/2023  -     TSH w/reflex to FT4; Future; Expected date: 01/11/2023  -     Urinalysis,  Reflex to Urine Culture Urine, Clean Catch; Future; Expected date: 01/11/2023  -     CBC Auto Differential; Future; Expected date: 01/11/2023    Screening for ischemic heart disease (IHD)  -     Lipid Panel; Future; Expected date: 01/11/2023  -     Comprehensive Metabolic Panel; Future; Expected date: 01/11/2023    Screening for blood or protein in urine  -     Microalbumin/Creatinine Ratio, Urine; Future; Expected date: 01/11/2023  -     Urinalysis, Reflex to Urine Culture Urine, Clean Catch; Future; Expected date: 01/11/2023    Screening for endocrine disorder  -     TSH w/reflex to FT4; Future; Expected date: 01/11/2023      Follow up in about 6 months (around 7/11/2023) for HTN, Headache, LABS.        1/20/2023 Tho Alfonso

## 2023-01-12 ENCOUNTER — TELEPHONE (OUTPATIENT)
Dept: FAMILY MEDICINE | Facility: CLINIC | Age: 48
End: 2023-01-12

## 2023-01-12 NOTE — TELEPHONE ENCOUNTER
----- Message from Elisabeth Ledesma sent at 1/11/2023  5:45 PM CST -----  The patient saw Dr. Alfonso today. She needs  a 4 pm in 6 months. I could not over ride a private spot . She will look on her portal. pt's # [phone] GH

## 2023-03-20 DIAGNOSIS — I10 PRIMARY HYPERTENSION: ICD-10-CM

## 2023-03-20 NOTE — TELEPHONE ENCOUNTER
----- Message from Axel Mora sent at 3/20/2023  8:34 AM CDT -----  Regarding: refill  Contact: patient  Type:  RX Refill Request    Who Called:  patient   Refill or New Rx:  refill  RX Name and Strength:  NORVASC) 2.5 MG   How is the patient currently taking it? (ex. 1XDay):  twice a day  Is this a 30 day or 90 day RX:  30day    Preferred Pharmacy with phone number:    [id number]  [place] - [address] [place] [address]  [place] [address]  Phone: [phone] Fax: [phone]     Local or Mail Order:  local  Ordering Provider:  Bonnie Horner  Best Call Back Number:  [phone] (home)     Case number [id number]

## 2023-03-21 RX ORDER — AMLODIPINE BESYLATE 2.5 MG/1
2.5 TABLET ORAL 2 TIMES DAILY
Qty: 180 TABLET | Refills: 3 | Status: SHIPPED | OUTPATIENT
Start: 2023-03-21 | End: 2024-03-26 | Stop reason: SDUPTHER

## 2023-03-21 NOTE — TELEPHONE ENCOUNTER
----- Message from Maria Luisa Gustafson sent at 3/21/2023  8:20 AM CDT -----  Type: Needs Medical Advice  Who Called:  pt  Symptoms (please be specific):  pt said she sent a message yesterday to have her amLODIPine (NORVASC) 2.5 MG tablet refilled and she did not receive a call that it was taken care of nor does the pharmacy have the rx to refill--she would like to speak to the nurse--please call and advise  Pharmacy name and phone #:    [id number] - [place], [place] - [address] [address]  Phone: [phone] Fax: [phone]      Best Call Back Number: [phone] (home)     Additional Information: thank you

## 2023-05-01 ENCOUNTER — TELEPHONE (OUTPATIENT)
Dept: FAMILY MEDICINE | Facility: CLINIC | Age: 48
End: 2023-05-01

## 2023-05-01 DIAGNOSIS — Z12.31 ENCOUNTER FOR SCREENING MAMMOGRAM FOR MALIGNANT NEOPLASM OF BREAST: Primary | ICD-10-CM

## 2023-05-01 NOTE — TELEPHONE ENCOUNTER
----- Message from Yue Wen sent at 5/1/2023  8:19 AM CDT -----  Pt called and would like to get an order placed for her Mammo. [phone]

## 2023-05-26 ENCOUNTER — HOSPITAL ENCOUNTER (OUTPATIENT)
Dept: RADIOLOGY | Facility: HOSPITAL | Age: 48
Discharge: HOME | End: 2023-05-26
Attending: FAMILY MEDICINE
Payer: COMMERCIAL

## 2023-05-26 VITALS — BODY MASS INDEX: 30.12 KG/M2 | WEIGHT: 153.44 LBS | HEIGHT: 60 IN

## 2023-05-26 DIAGNOSIS — Z12.31 ENCOUNTER FOR SCREENING MAMMOGRAM FOR MALIGNANT NEOPLASM OF BREAST: ICD-10-CM

## 2023-05-26 PROCEDURE — 77067 SCR MAMMO BI INCL CAD: CPT | Mod: TC,PO

## 2023-06-02 ENCOUNTER — TELEPHONE (OUTPATIENT)
Dept: FAMILY MEDICINE | Facility: CLINIC | Age: 48
End: 2023-06-02

## 2023-06-29 ENCOUNTER — TELEPHONE (OUTPATIENT)
Dept: FAMILY MEDICINE | Facility: CLINIC | Age: 48
End: 2023-06-29

## 2023-07-13 ENCOUNTER — OFFICE VISIT (OUTPATIENT)
Dept: FAMILY MEDICINE | Facility: CLINIC | Age: 48
End: 2023-07-13
Payer: COMMERCIAL

## 2023-07-13 VITALS
OXYGEN SATURATION: 97 % | SYSTOLIC BLOOD PRESSURE: 122 MMHG | BODY MASS INDEX: 31.57 KG/M2 | HEART RATE: 70 BPM | WEIGHT: 160.81 LBS | HEIGHT: 60 IN | DIASTOLIC BLOOD PRESSURE: 80 MMHG

## 2023-07-13 DIAGNOSIS — K44.9 HIATAL HERNIA: ICD-10-CM

## 2023-07-13 DIAGNOSIS — I10 PRIMARY HYPERTENSION: Primary | ICD-10-CM

## 2023-07-13 DIAGNOSIS — G43.829 MENSTRUAL MIGRAINE WITHOUT STATUS MIGRAINOSUS, NOT INTRACTABLE: ICD-10-CM

## 2023-07-13 PROCEDURE — 3079F PR MOST RECENT DIASTOLIC BLOOD PRESSURE 80-89 MM HG: ICD-10-PCS | Mod: CPTII,S$GLB,, | Performed by: FAMILY MEDICINE

## 2023-07-13 PROCEDURE — 99214 PR OFFICE/OUTPT VISIT, EST, LEVL IV, 30-39 MIN: ICD-10-PCS | Mod: S$GLB,,, | Performed by: FAMILY MEDICINE

## 2023-07-13 PROCEDURE — 3079F DIAST BP 80-89 MM HG: CPT | Mod: CPTII,S$GLB,, | Performed by: FAMILY MEDICINE

## 2023-07-13 PROCEDURE — 3074F PR MOST RECENT SYSTOLIC BLOOD PRESSURE < 130 MM HG: ICD-10-PCS | Mod: CPTII,S$GLB,, | Performed by: FAMILY MEDICINE

## 2023-07-13 PROCEDURE — 99214 OFFICE O/P EST MOD 30 MIN: CPT | Mod: S$GLB,,, | Performed by: FAMILY MEDICINE

## 2023-07-13 PROCEDURE — 1160F RVW MEDS BY RX/DR IN RCRD: CPT | Mod: CPTII,S$GLB,, | Performed by: FAMILY MEDICINE

## 2023-07-13 PROCEDURE — 3008F PR BODY MASS INDEX (BMI) DOCUMENTED: ICD-10-PCS | Mod: CPTII,S$GLB,, | Performed by: FAMILY MEDICINE

## 2023-07-13 PROCEDURE — 1159F MED LIST DOCD IN RCRD: CPT | Mod: CPTII,S$GLB,, | Performed by: FAMILY MEDICINE

## 2023-07-13 PROCEDURE — 3074F SYST BP LT 130 MM HG: CPT | Mod: CPTII,S$GLB,, | Performed by: FAMILY MEDICINE

## 2023-07-13 PROCEDURE — 3008F BODY MASS INDEX DOCD: CPT | Mod: CPTII,S$GLB,, | Performed by: FAMILY MEDICINE

## 2023-07-13 PROCEDURE — 1159F PR MEDICATION LIST DOCUMENTED IN MEDICAL RECORD: ICD-10-PCS | Mod: CPTII,S$GLB,, | Performed by: FAMILY MEDICINE

## 2023-07-13 PROCEDURE — 1160F PR REVIEW ALL MEDS BY PRESCRIBER/CLIN PHARMACIST DOCUMENTED: ICD-10-PCS | Mod: CPTII,S$GLB,, | Performed by: FAMILY MEDICINE

## 2023-07-13 NOTE — PROGRESS NOTES
"  SUBJECTIVE:    Patient ID: Aramis Rodarte is a 48 y.o. female.    Chief Complaint: Follow-up (No bottles//Pt is here for a 6 month check up//DAVID )    Pt here to checkup on acute and chronic conditions.    BP is doing ok.     Still working from home.  Works at [employer].    Has been exercising at home with light weight.     Continues to have chest heaviness. Has a hiatal hernia. Is very uncomfortable. Has not taken nexium in a while as she doesn't think it helps. Avoids trigger foods.     Migraines before menses have improved. Takes midol as needed. Her GYN recommended BTL as a method for bcp.    No recent labs.  ------------------------------------------------------------------------  Mammogram 5/2023, (Dr. Huber) 2 kids (20y and 18y);mom with breast cancer in 30s. No genetic testing and no colon cancer.   Pap in August 2021 (Clavin), never abnl  Cscope 6/2022, to repeat in 10 years.          Past Medical History:   Diagnosis Date    GERD (gastroesophageal reflux disease)     Hiatal hernia      Social History     Socioeconomic History    Marital status: Single   Tobacco Use    Smoking status: Never     Passive exposure: Never    Smokeless tobacco: Never   Substance and Sexual Activity    Alcohol use: Yes     Alcohol/week: 0.0 standard drinks     Comment: occasional    Drug use: No   Social History Narrative    Active but no exercise    Eating habits "ok"     Past Surgical History:   Procedure Laterality Date    NO PAST SURGERIES       Family History   Problem Relation Age of Onset    Breast cancer Mother     Diabetes Maternal Aunt     Hypertension Maternal Aunt     Diabetes Unknown         multiple     Coronary artery disease Maternal Grandmother         non premature    Valvular heart disease Father     Colon cancer Neg Hx        Review of patient's allergies indicates:  No Known Allergies    Current Outpatient Medications:     amLODIPine (NORVASC) 2.5 MG tablet, Take 1 tablet (2.5 mg total) by " mouth 2 (two) times daily., Disp: 180 tablet, Rfl: 3    TRI-SPRINTEC, 28, 0.18/0.215/0.25 mg-35 mcg (28) tablet, Take 1 tablet by mouth once daily., Disp: , Rfl:     Review of Systems   Constitutional:  Negative for appetite change, fatigue, fever and unexpected weight change.   Respiratory:  Negative for cough, chest tightness, shortness of breath and wheezing.    Cardiovascular:  Negative for chest pain and leg swelling.   Gastrointestinal:  Negative for abdominal pain, constipation, nausea and vomiting.        -heartburn   Genitourinary:  Negative for difficulty urinating, dysuria, frequency and urgency.   Musculoskeletal:  Negative for arthralgias, back pain, myalgias and neck pain.   Skin:  Negative for rash.   Neurological:  Negative for dizziness, weakness, numbness and headaches.   Hematological:  Does not bruise/bleed easily.   Psychiatric/Behavioral:  Negative for dysphoric mood, sleep disturbance and suicidal ideas. The patient is not nervous/anxious.    All other systems reviewed and are negative.       Objective:      Vitals:    07/13/23 1552   BP: 122/80   Pulse: 70   SpO2: 97%   Weight: 72.9 kg (160 lb 12.8 oz)   Height: 5' (1.524 m)     Wt Readings from Last 3 Encounters:   07/13/23 72.9 kg (160 lb 12.8 oz)   05/26/23 69.6 kg (153 lb 7 oz)   01/11/23 69.6 kg (153 lb 6.4 oz)       Physical Exam  Vitals reviewed.   Constitutional:       General: She is not in acute distress.     Appearance: Normal appearance. She is well-developed and overweight.   HENT:      Head: Normocephalic and atraumatic.   Neck:      Thyroid: No thyromegaly.   Cardiovascular:      Rate and Rhythm: Normal rate and regular rhythm.      Heart sounds: Normal heart sounds. No murmur heard.    No friction rub.   Pulmonary:      Effort: Pulmonary effort is normal.      Breath sounds: Normal breath sounds. No wheezing or rales.   Abdominal:      General: Bowel sounds are normal. There is no distension.      Palpations: Abdomen is soft.       Tenderness: There is no abdominal tenderness.   Musculoskeletal:      Cervical back: Neck supple.   Lymphadenopathy:      Cervical: No cervical adenopathy.   Skin:     General: Skin is warm and dry.      Findings: No rash.   Neurological:      Mental Status: She is alert and oriented to person, place, and time.   Psychiatric:         Attention and Perception: She is attentive.         Speech: Speech normal.         Behavior: Behavior normal.         Thought Content: Thought content normal.         Judgment: Judgment normal.         Assessment:       1. Primary hypertension    2. Hiatal hernia    3. Menstrual migraine without status migrainosus, not intractable           Plan:       Primary hypertension  Comments:  Controlled.  Will continue to monitor BP on current medication regimen    Hiatal hernia  Comments:  Controlled. Will continue to monitor symptoms    Menstrual migraine without status migrainosus, not intractable  Comments:  Controlled. Will continue to monitor symptoms      Labs and/or tests have been ordered for the evaluation/monitoring of acute/chronic conditions, to be done just before next visit.    Follow up in about 4 months (around 11/13/2023) for HTN, LABS.        7/13/2023 Tho Alfonso

## 2023-07-20 ENCOUNTER — OFFICE VISIT (OUTPATIENT)
Dept: CARDIOLOGY | Facility: CLINIC | Age: 48
End: 2023-07-20
Payer: COMMERCIAL

## 2023-07-20 VITALS
WEIGHT: 162 LBS | SYSTOLIC BLOOD PRESSURE: 118 MMHG | DIASTOLIC BLOOD PRESSURE: 68 MMHG | BODY MASS INDEX: 31.8 KG/M2 | HEIGHT: 60 IN

## 2023-07-20 DIAGNOSIS — R07.89 ATYPICAL CHEST PAIN: Primary | ICD-10-CM

## 2023-07-20 DIAGNOSIS — E78.2 MIXED HYPERLIPIDEMIA: ICD-10-CM

## 2023-07-20 DIAGNOSIS — K44.9 HIATAL HERNIA: ICD-10-CM

## 2023-07-20 DIAGNOSIS — I10 HYPERTENSIVE RESPONSE TO EXERCISE: ICD-10-CM

## 2023-07-20 PROCEDURE — 93005 ELECTROCARDIOGRAM TRACING: CPT | Mod: S$GLB,,, | Performed by: NURSE PRACTITIONER

## 2023-07-20 PROCEDURE — 1159F MED LIST DOCD IN RCRD: CPT | Mod: CPTII,S$GLB,, | Performed by: NURSE PRACTITIONER

## 2023-07-20 PROCEDURE — 3074F SYST BP LT 130 MM HG: CPT | Mod: CPTII,S$GLB,, | Performed by: NURSE PRACTITIONER

## 2023-07-20 PROCEDURE — 3078F DIAST BP <80 MM HG: CPT | Mod: CPTII,S$GLB,, | Performed by: NURSE PRACTITIONER

## 2023-07-20 PROCEDURE — 93005 EKG 12-LEAD: ICD-10-PCS | Mod: S$GLB,,, | Performed by: NURSE PRACTITIONER

## 2023-07-20 PROCEDURE — 1160F PR REVIEW ALL MEDS BY PRESCRIBER/CLIN PHARMACIST DOCUMENTED: ICD-10-PCS | Mod: CPTII,S$GLB,, | Performed by: NURSE PRACTITIONER

## 2023-07-20 PROCEDURE — 99214 OFFICE O/P EST MOD 30 MIN: CPT | Mod: S$GLB,,, | Performed by: NURSE PRACTITIONER

## 2023-07-20 PROCEDURE — 93010 ELECTROCARDIOGRAM REPORT: CPT | Mod: S$GLB,,, | Performed by: INTERNAL MEDICINE

## 2023-07-20 PROCEDURE — 93010 EKG 12-LEAD: ICD-10-PCS | Mod: S$GLB,,, | Performed by: INTERNAL MEDICINE

## 2023-07-20 PROCEDURE — 3008F PR BODY MASS INDEX (BMI) DOCUMENTED: ICD-10-PCS | Mod: CPTII,S$GLB,, | Performed by: NURSE PRACTITIONER

## 2023-07-20 PROCEDURE — 3008F BODY MASS INDEX DOCD: CPT | Mod: CPTII,S$GLB,, | Performed by: NURSE PRACTITIONER

## 2023-07-20 PROCEDURE — 99999 PR PBB SHADOW E&M-EST. PATIENT-LVL III: CPT | Mod: PBBFAC,,, | Performed by: NURSE PRACTITIONER

## 2023-07-20 PROCEDURE — 99214 PR OFFICE/OUTPT VISIT, EST, LEVL IV, 30-39 MIN: ICD-10-PCS | Mod: S$GLB,,, | Performed by: NURSE PRACTITIONER

## 2023-07-20 PROCEDURE — 99999 PR PBB SHADOW E&M-EST. PATIENT-LVL III: ICD-10-PCS | Mod: PBBFAC,,, | Performed by: NURSE PRACTITIONER

## 2023-07-20 PROCEDURE — 3074F PR MOST RECENT SYSTOLIC BLOOD PRESSURE < 130 MM HG: ICD-10-PCS | Mod: CPTII,S$GLB,, | Performed by: NURSE PRACTITIONER

## 2023-07-20 PROCEDURE — 1159F PR MEDICATION LIST DOCUMENTED IN MEDICAL RECORD: ICD-10-PCS | Mod: CPTII,S$GLB,, | Performed by: NURSE PRACTITIONER

## 2023-07-20 PROCEDURE — 1160F RVW MEDS BY RX/DR IN RCRD: CPT | Mod: CPTII,S$GLB,, | Performed by: NURSE PRACTITIONER

## 2023-07-20 PROCEDURE — 3078F PR MOST RECENT DIASTOLIC BLOOD PRESSURE < 80 MM HG: ICD-10-PCS | Mod: CPTII,S$GLB,, | Performed by: NURSE PRACTITIONER

## 2023-07-20 NOTE — ASSESSMENT & PLAN NOTE
EKG shows sinus rhythm with no ST or T-wave abnormalities.  Chest discomfort is not indicative of ischemia and is likely related to reflux, possible esophagitis, and existing hiatal hernia.  Will refer to Gastroenterology for further evaluation of symptoms.

## 2023-07-20 NOTE — PROGRESS NOTES
Subjective:    Patient ID:  Aramis Rodarte is a 48 y.o. female   Chief Complaint   Patient presents with    Follow-up       HPI:    Patient presents today for follow-up appointment.  She continues to have atypical chest pressure which occurs essentially at all times.  It is not worsened with exertion it is not improved with any means.  She also deals with reflux.  She has not been seen by Gastroenterology in several years.  Review of patient's allergies indicates:  No Known Allergies    Past Medical History:   Diagnosis Date    GERD (gastroesophageal reflux disease)     Hiatal hernia      Past Surgical History:   Procedure Laterality Date    NO PAST SURGERIES       Social History     Tobacco Use    Smoking status: Never     Passive exposure: Never    Smokeless tobacco: Never   Substance Use Topics    Alcohol use: Yes     Alcohol/week: 0.0 standard drinks     Comment: occasional    Drug use: No     Family History   Problem Relation Age of Onset    Breast cancer Mother     Diabetes Maternal Aunt     Hypertension Maternal Aunt     Diabetes Unknown         multiple     Coronary artery disease Maternal Grandmother         non premature    Valvular heart disease Father     Colon cancer Neg Hx         Review of Systems:   Constitution: Negative for diaphoresis and fever.   HEENT: Negative for nosebleeds.    Cardiovascular: + for chest pain: not related to exertion       No dyspnea on exertion       No leg swelling        No palpitations  Respiratory: Negative for shortness of breath and wheezing.    Hematologic/Lymphatic: Negative for bleeding problem. Does not bruise/bleed easily.   Skin: Negative for color change and rash.   Musculoskeletal: Negative for falls and myalgias.   Gastrointestinal: Negative for hematemesis and hematochezia. + reflux  Genitourinary: Negative for hematuria.   Neurological: Negative for dizziness and light-headedness.   Psychiatric/Behavioral: Negative for altered mental status and memory loss.           Objective:        Vitals:    07/20/23 1556   BP: 118/68       Lab Results   Component Value Date    WBC 5.4 04/30/2022    HGB 11.5 (L) 04/30/2022    HCT 35.8 04/30/2022     04/30/2022    CHOL 182 04/30/2022    TRIG 66 04/30/2022    HDL 53 04/30/2022    ALT 10 04/30/2022    AST 15 04/30/2022     04/30/2022    K 3.9 04/30/2022     04/30/2022    CREATININE 0.77 04/30/2022    BUN 18 04/30/2022    CO2 26 04/30/2022    TSH 0.729 01/25/2020    HGBA1C 5.0 01/25/2020        ECHOCARDIOGRAM RESULTS  Results for orders placed during the hospital encounter of 03/11/22    Echo    Interpretation Summary  · The left ventricle is normal in size with normal systolic function.  · The estimated ejection fraction is 65%.  · Normal left ventricular diastolic function.  · Normal right ventricular size with normal right ventricular systolic function.  · Mild mitral regurgitation.  · Mild to moderate tricuspid regurgitation.        CURRENT/PREVIOUS VISIT EKG  Results for orders placed or performed in visit on 02/01/22   IN OFFICE EKG 12-LEAD (to [place])    Collection Time: 02/01/22  4:50 PM    Narrative    Test Reason : R07.89,    Vent. Rate : 071 BPM     Atrial Rate : 071 BPM     P-R Int : 122 ms          QRS Dur : 072 ms      QT Int : 406 ms       P-R-T Axes : 049 028 028 degrees     QTc Int : 441 ms    Program found technically poor ECG  Normal sinus rhythm  Normal ECG  When compared with ECG of 30-MAR-2016 11:10,  No significant change was found  Confirmed by Jeronimo Zaragoza MD ([id number]) on 2/27/2022 9:17:56 PM    Referred By: CHRISTINE ARCEO           Confirmed By:Jeronimo Zaragoza MD     No valid procedures specified.   Results for orders placed during the hospital encounter of 03/11/22    Nuclear Stress - Cardiology Interpreted    Interpretation Summary    Normal myocardial perfusion scan. There is no evidence of myocardial ischemia or infarction.    The gated perfusion images showed an ejection fraction of 69% post  stress. Normal ejection fraction is greater than 53%.    There is normal wall motion at rest and post stress.    LV cavity size is normal at rest and normal at stress.    The EKG portion of this study is negative for ischemia.    The patient reported no chest pain during the stress test.    Patient exercised on a Andrew protocol for 7 minutes and 43 seconds and attained a maximum heart rate of 161 beats per minute which is 92% of the maximum predicted heart rate and attained 10.1 METS .  Patient had hypertensive response to exercise.      Physical Exam:  CONSTITUTIONAL: No fever, no chills  HEENT: Normocephalic, atraumatic,pupils reactive to light                 NECK:  No JVD no carotid bruit  CVS: S1S2+, RRR  LUNGS: Clear  ABDOMEN: Soft, NT, BS+  EXTREMITIES: No cyanosis, edema  : No thorpe catheter  NEURO: AAO X 3  PSY: Normal affect      Medication List with Changes/Refills   Current Medications    AMLODIPINE (NORVASC) 2.5 MG TABLET    Take 1 tablet (2.5 mg total) by mouth 2 (two) times daily.    TRI-SPRINTEC, 28, 0.18/0.215/0.25 MG-35 MCG (28) TABLET    Take 1 tablet by mouth once daily.             Assessment:       1. Atypical chest pain    2. Hypertensive response to exercise    3. Mixed hyperlipidemia    4. Hiatal hernia         Plan:     Problem List Items Addressed This Visit          Unprioritized    Atypical chest pain - Primary    Current Assessment & Plan     EKG shows sinus rhythm with no ST or T-wave abnormalities.  Chest discomfort is not indicative of ischemia and is likely related to reflux, possible esophagitis, and existing hiatal hernia.  Will refer to Gastroenterology for further evaluation of symptoms.         Relevant Orders    IN OFFICE EKG 12-LEAD (to [place])    Ambulatory referral/consult to Gastroenterology    Mixed hyperlipidemia    Current Assessment & Plan     Recommend low-fat low-cholesterol diet and regular exercise.  Goal for LDL is less than 100.         Hiatal hernia    Relevant  Orders    Ambulatory referral/consult to Gastroenterology    Hypertensive response to exercise    Current Assessment & Plan     Continue amlodipine 2.5 mg p.o. b.i.d..  Blood pressure stable on this regimen.            Follow up in about 1 year (around 7/20/2024).

## 2023-10-09 ENCOUNTER — TELEPHONE (OUTPATIENT)
Dept: FAMILY MEDICINE | Facility: CLINIC | Age: 48
End: 2023-10-09

## 2023-10-09 NOTE — TELEPHONE ENCOUNTER
----- Message from Jyoti Tran sent at 10/9/2023 10:56 AM CDT -----  Pt wants to reschedule her 11/8/ appt. Pt #[phone]

## 2023-10-31 LAB
ALBUMIN SERPL-MCNC: 4 G/DL (ref 3.6–5.1)
ALBUMIN/CREAT UR: 5 MCG/MG CREAT
ALBUMIN/GLOB SERPL: 1.4 (CALC) (ref 1–2.5)
ALP SERPL-CCNC: 45 U/L (ref 31–125)
ALT SERPL-CCNC: 10 U/L (ref 6–29)
APPEARANCE UR: CLEAR
AST SERPL-CCNC: 14 U/L (ref 10–35)
BACTERIA #/AREA URNS HPF: ABNORMAL /HPF
BACTERIA UR CULT: ABNORMAL
BASOPHILS # BLD AUTO: 89 CELLS/UL (ref 0–200)
BASOPHILS NFR BLD AUTO: 1.5 %
BILIRUB SERPL-MCNC: 0.6 MG/DL (ref 0.2–1.2)
BILIRUB UR QL STRIP: NEGATIVE
BUN SERPL-MCNC: 14 MG/DL (ref 7–25)
BUN/CREAT SERPL: NORMAL (CALC) (ref 6–22)
CALCIUM SERPL-MCNC: 8.9 MG/DL (ref 8.6–10.2)
CHLORIDE SERPL-SCNC: 104 MMOL/L (ref 98–110)
CHOLEST SERPL-MCNC: 225 MG/DL
CHOLEST/HDLC SERPL: 3.8 (CALC)
CO2 SERPL-SCNC: 25 MMOL/L (ref 20–32)
COLOR UR: ABNORMAL
CREAT SERPL-MCNC: 0.96 MG/DL (ref 0.5–0.99)
CREAT UR-MCNC: 269 MG/DL (ref 20–275)
EGFR: 74 ML/MIN/1.73M2
EOSINOPHIL # BLD AUTO: 189 CELLS/UL (ref 15–500)
EOSINOPHIL NFR BLD AUTO: 3.2 %
ERYTHROCYTE [DISTWIDTH] IN BLOOD BY AUTOMATED COUNT: 12.2 % (ref 11–15)
GLOBULIN SER CALC-MCNC: 2.9 G/DL (CALC) (ref 1.9–3.7)
GLUCOSE SERPL-MCNC: 78 MG/DL (ref 65–99)
GLUCOSE UR QL STRIP: NEGATIVE
HCT VFR BLD AUTO: 37.2 % (ref 35–45)
HDLC SERPL-MCNC: 60 MG/DL
HGB BLD-MCNC: 12.1 G/DL (ref 11.7–15.5)
HGB UR QL STRIP: ABNORMAL
HYALINE CASTS #/AREA URNS LPF: ABNORMAL /LPF
KETONES UR QL STRIP: NEGATIVE
LDLC SERPL CALC-MCNC: 150 MG/DL (CALC)
LEUKOCYTE ESTERASE UR QL STRIP: NEGATIVE
LYMPHOCYTES # BLD AUTO: 1676 CELLS/UL (ref 850–3900)
LYMPHOCYTES NFR BLD AUTO: 28.4 %
MCH RBC QN AUTO: 28.9 PG (ref 27–33)
MCHC RBC AUTO-ENTMCNC: 32.5 G/DL (ref 32–36)
MCV RBC AUTO: 89 FL (ref 80–100)
MICROALBUMIN UR-MCNC: 1.3 MG/DL
MONOCYTES # BLD AUTO: 561 CELLS/UL (ref 200–950)
MONOCYTES NFR BLD AUTO: 9.5 %
NEUTROPHILS # BLD AUTO: 3387 CELLS/UL (ref 1500–7800)
NEUTROPHILS NFR BLD AUTO: 57.4 %
NITRITE UR QL STRIP: NEGATIVE
NONHDLC SERPL-MCNC: 165 MG/DL (CALC)
PH UR STRIP: 6 [PH] (ref 5–8)
PLATELET # BLD AUTO: 325 THOUSAND/UL (ref 140–400)
PMV BLD REES-ECKER: 11.5 FL (ref 7.5–12.5)
POTASSIUM SERPL-SCNC: 3.8 MMOL/L (ref 3.5–5.3)
PROT SERPL-MCNC: 6.9 G/DL (ref 6.1–8.1)
PROT UR QL STRIP: NEGATIVE
RBC # BLD AUTO: 4.18 MILLION/UL (ref 3.8–5.1)
RBC #/AREA URNS HPF: ABNORMAL /HPF
SERVICE CMNT-IMP: ABNORMAL
SODIUM SERPL-SCNC: 140 MMOL/L (ref 135–146)
SP GR UR STRIP: 1.02 (ref 1–1.03)
SQUAMOUS #/AREA URNS HPF: ABNORMAL /HPF
TRIGL SERPL-MCNC: 60 MG/DL
TSH SERPL-ACNC: 1.36 MIU/L
WBC # BLD AUTO: 5.9 THOUSAND/UL (ref 3.8–10.8)
WBC #/AREA URNS HPF: ABNORMAL /HPF

## 2023-11-08 ENCOUNTER — TELEPHONE (OUTPATIENT)
Dept: FAMILY MEDICINE | Facility: CLINIC | Age: 48
End: 2023-11-08

## 2023-11-08 ENCOUNTER — OFFICE VISIT (OUTPATIENT)
Dept: FAMILY MEDICINE | Facility: CLINIC | Age: 48
End: 2023-11-08
Payer: COMMERCIAL

## 2023-11-08 VITALS
SYSTOLIC BLOOD PRESSURE: 136 MMHG | BODY MASS INDEX: 30.63 KG/M2 | HEIGHT: 60 IN | DIASTOLIC BLOOD PRESSURE: 84 MMHG | WEIGHT: 156 LBS | HEART RATE: 76 BPM

## 2023-11-08 DIAGNOSIS — E78.2 MIXED HYPERLIPIDEMIA: ICD-10-CM

## 2023-11-08 DIAGNOSIS — I10 PRIMARY HYPERTENSION: Primary | ICD-10-CM

## 2023-11-08 PROCEDURE — 1159F PR MEDICATION LIST DOCUMENTED IN MEDICAL RECORD: ICD-10-PCS | Mod: CPTII,S$GLB,, | Performed by: FAMILY MEDICINE

## 2023-11-08 PROCEDURE — 3008F BODY MASS INDEX DOCD: CPT | Mod: CPTII,S$GLB,, | Performed by: FAMILY MEDICINE

## 2023-11-08 PROCEDURE — 3066F PR DOCUMENTATION OF TREATMENT FOR NEPHROPATHY: ICD-10-PCS | Mod: CPTII,S$GLB,, | Performed by: FAMILY MEDICINE

## 2023-11-08 PROCEDURE — 99214 PR OFFICE/OUTPT VISIT, EST, LEVL IV, 30-39 MIN: ICD-10-PCS | Mod: S$GLB,,, | Performed by: FAMILY MEDICINE

## 2023-11-08 PROCEDURE — 3061F NEG MICROALBUMINURIA REV: CPT | Mod: CPTII,S$GLB,, | Performed by: FAMILY MEDICINE

## 2023-11-08 PROCEDURE — 1160F RVW MEDS BY RX/DR IN RCRD: CPT | Mod: CPTII,S$GLB,, | Performed by: FAMILY MEDICINE

## 2023-11-08 PROCEDURE — 1160F PR REVIEW ALL MEDS BY PRESCRIBER/CLIN PHARMACIST DOCUMENTED: ICD-10-PCS | Mod: CPTII,S$GLB,, | Performed by: FAMILY MEDICINE

## 2023-11-08 PROCEDURE — 3066F NEPHROPATHY DOC TX: CPT | Mod: CPTII,S$GLB,, | Performed by: FAMILY MEDICINE

## 2023-11-08 PROCEDURE — 3075F SYST BP GE 130 - 139MM HG: CPT | Mod: CPTII,S$GLB,, | Performed by: FAMILY MEDICINE

## 2023-11-08 PROCEDURE — 3075F PR MOST RECENT SYSTOLIC BLOOD PRESS GE 130-139MM HG: ICD-10-PCS | Mod: CPTII,S$GLB,, | Performed by: FAMILY MEDICINE

## 2023-11-08 PROCEDURE — 3079F PR MOST RECENT DIASTOLIC BLOOD PRESSURE 80-89 MM HG: ICD-10-PCS | Mod: CPTII,S$GLB,, | Performed by: FAMILY MEDICINE

## 2023-11-08 PROCEDURE — 3008F PR BODY MASS INDEX (BMI) DOCUMENTED: ICD-10-PCS | Mod: CPTII,S$GLB,, | Performed by: FAMILY MEDICINE

## 2023-11-08 PROCEDURE — 3061F PR NEG MICROALBUMINURIA RESULT DOCUMENTED/REVIEW: ICD-10-PCS | Mod: CPTII,S$GLB,, | Performed by: FAMILY MEDICINE

## 2023-11-08 PROCEDURE — 1159F MED LIST DOCD IN RCRD: CPT | Mod: CPTII,S$GLB,, | Performed by: FAMILY MEDICINE

## 2023-11-08 PROCEDURE — 99214 OFFICE O/P EST MOD 30 MIN: CPT | Mod: S$GLB,,, | Performed by: FAMILY MEDICINE

## 2023-11-08 PROCEDURE — 3079F DIAST BP 80-89 MM HG: CPT | Mod: CPTII,S$GLB,, | Performed by: FAMILY MEDICINE

## 2023-11-08 NOTE — PROGRESS NOTES
SUBJECTIVE:    Patient ID: Aramis Rodarte is a 48 y.o. female.    Chief Complaint: Follow-up (3 mo f/u//no med bottles//tc)    Pt here to checkup on acute and chronic conditions.    BP is doing ok.     Still working from home.  Works at [employer].    Has been exercising at home with light weights and walking 30-45 min.     Continues to have chest heaviness. (Siri, NP) wants her to do another Has a hiatal hernia. Is very uncomfortable. Has not taken nexium in a while as she doesn't think it helps. Avoids trigger foods.     Migraines before menses have improved.  Takes midol as needed.    Had labs done: ,  fBS 78, GFR 74, TSH 1.36, Hct 37.2  ------------------------------------------------------------------------  Mammogram 5/2023, (Dr. Huber) 2 kids (20y and 18y);mom with breast cancer in 30s. No genetic testing and no colon cancer.   Pap in August 2021 (Clavin), never abnl  Cscope 6/2022, to repeat in 10 years.      Results for orders placed or performed in visit on 01/11/23   Lipid Panel   Result Value Ref Range    Cholesterol 225 (H) <200 mg/dL    HDL 60 > OR = 50 mg/dL    Triglycerides 60 <150 mg/dL    LDL Cholesterol 150 (H) mg/dL (calc)    HDL/Cholesterol Ratio 3.8 <5.0 (calc)    Non HDL Chol. (LDL+VLDL) 165 (H) <130 mg/dL (calc)   Comprehensive Metabolic Panel   Result Value Ref Range    Glucose 78 65 - 99 mg/dL    BUN 14 7 - 25 mg/dL    Creatinine 0.96 0.50 - 0.99 mg/dL    eGFR 74 > OR = 60 mL/min/1.73m2    BUN/Creatinine Ratio SEE NOTE: 6 - 22 (calc)    Sodium 140 135 - 146 mmol/L    Potassium 3.8 3.5 - 5.3 mmol/L    Chloride 104 98 - 110 mmol/L    CO2 25 20 - 32 mmol/L    Calcium 8.9 8.6 - 10.2 mg/dL    Total Protein 6.9 6.1 - 8.1 g/dL    Albumin 4.0 3.6 - 5.1 g/dL    Globulin, Total 2.9 1.9 - 3.7 g/dL (calc)    Albumin/Globulin Ratio 1.4 1.0 - 2.5 (calc)    Total Bilirubin 0.6 0.2 - 1.2 mg/dL    Alkaline Phosphatase 45 31 - 125 U/L    AST 14 10 - 35 U/L    ALT 10 6 - 29 U/L    Microalbumin/Creatinine Ratio, Urine   Result Value Ref Range    Creatinine, Urine 269 20 - 275 mg/dL    Microalb, Ur 1.3 See Note: mg/dL    Microalb/Creat Ratio 5 <30 mcg/mg creat   TSH w/reflex to FT4   Result Value Ref Range    TSH w/reflex to FT4 1.36 mIU/L   Urinalysis, Reflex to Urine Culture Urine, Clean Catch    Specimen: Urine   Result Value Ref Range    Color, UA DARK YELLOW YELLOW    Appearance, UA CLEAR CLEAR    Specific Gravity, UA 1.024 1.001 - 1.035    pH, UA 6.0 5.0 - 8.0    Glucose, UA NEGATIVE NEGATIVE    Bilirubin, UA NEGATIVE NEGATIVE    Ketones, UA NEGATIVE NEGATIVE    Occult Blood UA 1+ (A) NEGATIVE    Protein, UA NEGATIVE NEGATIVE    Nitrite, UA NEGATIVE NEGATIVE    Leukocytes, UA NEGATIVE NEGATIVE    WBC Casts, UA NONE SEEN < OR = 5 /HPF    RBC Casts, UA 0-2 < OR = 2 /HPF    Squam Epithel, UA 0-5 < OR = 5 /HPF    Bacteria, UA FEW (A) NONE SEEN /HPF    Hyaline Casts, UA NONE SEEN NONE SEEN /LPF    Service Cmt:      Reflexive Urine Culture     CBC Auto Differential   Result Value Ref Range    WBC 5.9 3.8 - 10.8 Thousand/uL    RBC 4.18 3.80 - 5.10 Million/uL    Hemoglobin 12.1 11.7 - 15.5 g/dL    Hematocrit 37.2 35.0 - 45.0 %    MCV 89.0 80.0 - 100.0 fL    MCH 28.9 27.0 - 33.0 pg    MCHC 32.5 32.0 - 36.0 g/dL    RDW 12.2 11.0 - 15.0 %    Platelets 325 140 - 400 Thousand/uL    MPV 11.5 7.5 - 12.5 fL    Neutrophils, Abs 3,387 1,500 - 7,800 cells/uL    Lymph # 1,676 850 - 3,900 cells/uL    Mono # 561 200 - 950 cells/uL    Eos # 189 15 - 500 cells/uL    Baso # 89 0 - 200 cells/uL    Neutrophils Relative 57.4 %    Lymph % 28.4 %    Mono % 9.5 %    Eosinophil % 3.2 %    Basophil % 1.5 %           Past Medical History:   Diagnosis Date    GERD (gastroesophageal reflux disease)     Hiatal hernia      Social History     Socioeconomic History    Marital status: Single   Tobacco Use    Smoking status: Never     Passive exposure: Never    Smokeless tobacco: Never   Substance and Sexual Activity    Alcohol  "use: Yes     Alcohol/week: 0.0 standard drinks of alcohol     Comment: occasional    Drug use: No   Social History Narrative    Active but no exercise    Eating habits "ok"     Social Determinants of Health     Financial Resource Strain: Medium Risk (12/23/2020)    Overall Financial Resource Strain (CARDIA)     Difficulty of Paying Living Expenses: Somewhat hard   Food Insecurity: No Food Insecurity (12/23/2020)    Hunger Vital Sign     Worried About Running Out of Food in the Last Year: Never true     Ran Out of Food in the Last Year: Never true   Transportation Needs: No Transportation Needs (12/23/2020)    PRAPARE - Transportation     Lack of Transportation (Medical): No     Lack of Transportation (Non-Medical): No   Physical Activity: Sufficiently Active (12/23/2020)    Exercise Vital Sign     Days of Exercise per Week: 3 days     Minutes of Exercise per Session: 70 min   Stress: No Stress Concern Present (12/23/2020)    [employer] [place] [employer] - Occupational Stress Questionnaire     Feeling of Stress : Not at all   Social Connections: Unknown (12/23/2020)    Social Connection and Isolation Panel [NHANES]     Frequency of Communication with Friends and Family: Twice a week     Frequency of Social Gatherings with Friends and Family: Never     Active Member of Clubs or Organizations: No     Marital Status: Never      Past Surgical History:   Procedure Laterality Date    NO PAST SURGERIES       Family History   Problem Relation Age of Onset    Breast cancer Mother     Diabetes Maternal Aunt     Hypertension Maternal Aunt     Diabetes Unknown         multiple     Coronary artery disease Maternal Grandmother         non premature    Valvular heart disease Father     Colon cancer Neg Hx        Review of patient's allergies indicates:  No Known Allergies    Current Outpatient Medications:     amLODIPine (NORVASC) 2.5 MG tablet, Take 1 tablet (2.5 mg total) by mouth 2 (two) times daily., Disp: " 180 tablet, Rfl: 3    TRI-SPRINTEC, 28, 0.18/0.215/0.25 mg-35 mcg (28) tablet, Take 1 tablet by mouth once daily., Disp: , Rfl:     Review of Systems   Constitutional:  Negative for appetite change, fatigue, fever and unexpected weight change.   Respiratory:  Negative for cough, chest tightness, shortness of breath and wheezing.    Cardiovascular:  Negative for chest pain and leg swelling.   Gastrointestinal:  Negative for abdominal pain, constipation, nausea and vomiting.        -heartburn   Genitourinary:  Negative for difficulty urinating, dysuria, frequency and urgency.   Musculoskeletal:  Negative for arthralgias, back pain, myalgias and neck pain.   Skin:  Negative for rash.   Neurological:  Negative for dizziness, weakness, numbness and headaches.   Hematological:  Does not bruise/bleed easily.   Psychiatric/Behavioral:  Negative for dysphoric mood, sleep disturbance and suicidal ideas. The patient is not nervous/anxious.    All other systems reviewed and are negative.         Objective:      Vitals:    11/08/23 1543   BP: 136/84   Pulse: 76   Weight: 70.8 kg (156 lb)   Height: 5' (1.524 m)     Wt Readings from Last 3 Encounters:   11/08/23 70.8 kg (156 lb)   07/20/23 73.5 kg (162 lb)   07/13/23 72.9 kg (160 lb 12.8 oz)       Physical Exam  Vitals reviewed.   Constitutional:       General: She is not in acute distress.     Appearance: Normal appearance. She is well-developed and overweight.   HENT:      Head: Normocephalic and atraumatic.   Neck:      Thyroid: No thyromegaly.   Cardiovascular:      Rate and Rhythm: Normal rate and regular rhythm.      Heart sounds: Normal heart sounds. No murmur heard.     No friction rub.   Pulmonary:      Effort: Pulmonary effort is normal.      Breath sounds: Normal breath sounds. No wheezing or rales.   Abdominal:      General: Bowel sounds are normal. There is no distension.      Palpations: Abdomen is soft.      Tenderness: There is no abdominal tenderness.    Musculoskeletal:      Cervical back: Neck supple.   Lymphadenopathy:      Cervical: No cervical adenopathy.   Skin:     General: Skin is warm and dry.      Findings: No rash.   Neurological:      Mental Status: She is alert and oriented to person, place, and time.   Psychiatric:         Attention and Perception: She is attentive.         Speech: Speech normal.         Behavior: Behavior normal.         Thought Content: Thought content normal.         Judgment: Judgment normal.           Assessment:       1. Primary hypertension    2. Mixed hyperlipidemia             Plan:       Primary hypertension  Comments:  Controlled. Will continue to monitor BP on current medication regimen    Mixed hyperlipidemia  Comments:  Suboptimal. . Ascvd 3.2% 10 yr risk.  Pt elects lifestyle changes right now.        Labs and/or tests have been ordered for the evaluation/monitoring of acute/chronic conditions, to be done just before next visit.    Follow up in about 3 months (around 2/8/2024) for HTN, HLD.        11/26/2023 Tho Alfonso

## 2024-03-10 NOTE — PROGRESS NOTES
SUBJECTIVE:    Patient ID: Aramis Rodarte is a 49 y.o. female.    Chief Complaint: Follow-up (4 mo f/u//no med bottles//no complaints//tc)    Pt here to checkup on acute and chronic conditions.    BP is doing ok.     Still working from home.  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    Has been exercising at home with light weights and walking 30-45 min.     Continues to have chest heaviness. (Siri, NP) Takes nexium which helps sometimes.  Has a hiatal hernia. Is very uncomfortable. Avoids trigger foods.     Migraines before menses have improved.  Last was over a month ago. Takes midol as needed.    No recent labs.  ------------------------------------------------------------------------  Mammogram 5/2023, (Dr. Huber) 2 kids (20y and 18y);mom with breast cancer in 30s. No genetic testing and no colon cancer.   Pap in August 2021 (Clavin), never abnl  Cscope 6/2022, to repeat in 10 years.      Results for orders placed or performed in visit on 01/11/23   Lipid Panel   Result Value Ref Range    Cholesterol 225 (H) <200 mg/dL    HDL 60 > OR = 50 mg/dL    Triglycerides 60 <150 mg/dL    LDL Cholesterol 150 (H) mg/dL (calc)    HDL/Cholesterol Ratio 3.8 <5.0 (calc)    Non HDL Chol. (LDL+VLDL) 165 (H) <130 mg/dL (calc)   Comprehensive Metabolic Panel   Result Value Ref Range    Glucose 78 65 - 99 mg/dL    BUN 14 7 - 25 mg/dL    Creatinine 0.96 0.50 - 0.99 mg/dL    eGFR 74 > OR = 60 mL/min/1.73m2    BUN/Creatinine Ratio SEE NOTE: 6 - 22 (calc)    Sodium 140 135 - 146 mmol/L    Potassium 3.8 3.5 - 5.3 mmol/L    Chloride 104 98 - 110 mmol/L    CO2 25 20 - 32 mmol/L    Calcium 8.9 8.6 - 10.2 mg/dL    Total Protein 6.9 6.1 - 8.1 g/dL    Albumin 4.0 3.6 - 5.1 g/dL    Globulin, Total 2.9 1.9 - 3.7 g/dL (calc)    Albumin/Globulin Ratio 1.4 1.0 - 2.5 (calc)    Total Bilirubin 0.6 0.2 - 1.2 mg/dL    Alkaline Phosphatase 45 31 - 125 U/L    AST 14 10 - 35 U/L    ALT 10 6 - 29 U/L   Microalbumin/Creatinine Ratio, Urine   Result Value Ref  Range    Creatinine, Urine 269 20 - 275 mg/dL    Microalb, Ur 1.3 See Note: mg/dL    Microalb/Creat Ratio 5 <30 mcg/mg creat   TSH w/reflex to FT4   Result Value Ref Range    TSH w/reflex to FT4 1.36 mIU/L   Urinalysis, Reflex to Urine Culture Urine, Clean Catch    Specimen: Urine   Result Value Ref Range    Color, UA DARK YELLOW YELLOW    Appearance, UA CLEAR CLEAR    Specific Gravity, UA 1.024 1.001 - 1.035    pH, UA 6.0 5.0 - 8.0    Glucose, UA NEGATIVE NEGATIVE    Bilirubin, UA NEGATIVE NEGATIVE    Ketones, UA NEGATIVE NEGATIVE    Occult Blood UA 1+ (A) NEGATIVE    Protein, UA NEGATIVE NEGATIVE    Nitrite, UA NEGATIVE NEGATIVE    Leukocytes, UA NEGATIVE NEGATIVE    WBC Casts, UA NONE SEEN < OR = 5 /HPF    RBC Casts, UA 0-2 < OR = 2 /HPF    Squam Epithel, UA 0-5 < OR = 5 /HPF    Bacteria, UA FEW (A) NONE SEEN /HPF    Hyaline Casts, UA NONE SEEN NONE SEEN /LPF    Service Cmt:      Reflexive Urine Culture     CBC Auto Differential   Result Value Ref Range    WBC 5.9 3.8 - 10.8 Thousand/uL    RBC 4.18 3.80 - 5.10 Million/uL    Hemoglobin 12.1 11.7 - 15.5 g/dL    Hematocrit 37.2 35.0 - 45.0 %    MCV 89.0 80.0 - 100.0 fL    MCH 28.9 27.0 - 33.0 pg    MCHC 32.5 32.0 - 36.0 g/dL    RDW 12.2 11.0 - 15.0 %    Platelets 325 140 - 400 Thousand/uL    MPV 11.5 7.5 - 12.5 fL    Neutrophils, Abs 3,387 1,500 - 7,800 cells/uL    Lymph # 1,676 850 - 3,900 cells/uL    Mono # 561 200 - 950 cells/uL    Eos # 189 15 - 500 cells/uL    Baso # 89 0 - 200 cells/uL    Neutrophils Relative 57.4 %    Lymph % 28.4 %    Mono % 9.5 %    Eosinophil % 3.2 %    Basophil % 1.5 %           Past Medical History:   Diagnosis Date    GERD (gastroesophageal reflux disease)     Hiatal hernia      Social History     Socioeconomic History    Marital status: Single   Tobacco Use    Smoking status: Never     Passive exposure: Never    Smokeless tobacco: Never   Substance and Sexual Activity    Alcohol use: Yes     Alcohol/week: 0.0 standard drinks of alcohol  "    Comment: occasional    Drug use: No   Social History Narrative    Active but no exercise    Eating habits "ok"     Social Determinants of Health     Financial Resource Strain: Medium Risk (12/23/2020)    Overall Financial Resource Strain (CARDIA)     Difficulty of Paying Living Expenses: Somewhat hard   Food Insecurity: No Food Insecurity (12/23/2020)    Hunger Vital Sign     Worried About Running Out of Food in the Last Year: Never true     Ran Out of Food in the Last Year: Never true   Transportation Needs: No Transportation Needs (12/23/2020)    PRAPARE - Transportation     Lack of Transportation (Medical): No     Lack of Transportation (Non-Medical): No   Physical Activity: Sufficiently Active (12/23/2020)    Exercise Vital Sign     Days of Exercise per Week: 3 days     Minutes of Exercise per Session: 70 min   Stress: No Stress Concern Present (12/23/2020)    [employer] [place] [place] [employer] - Occupational Stress Questionnaire     Feeling of Stress : Not at all   Social Connections: Unknown (12/23/2020)    Social Connection and Isolation Panel [NHANES]     Frequency of Communication with Friends and Family: Twice a week     Frequency of Social Gatherings with Friends and Family: Never     Active Member of Clubs or Organizations: No     Marital Status: Never      Past Surgical History:   Procedure Laterality Date    NO PAST SURGERIES       Family History   Problem Relation Age of Onset    Breast cancer Mother     Diabetes Maternal Aunt     Hypertension Maternal Aunt     Diabetes Unknown         multiple     Coronary artery disease Maternal Grandmother         non premature    Valvular heart disease Father     Colon cancer Neg Hx        Review of patient's allergies indicates:  No Known Allergies    Current Outpatient Medications:     amLODIPine (NORVASC) 2.5 MG tablet, Take 1 tablet (2.5 mg total) by mouth 2 (two) times daily., Disp: 180 tablet, Rfl: 3    TRI-SPRINTEC, 28, 0.18/0.215/0.25 " mg-35 mcg (28) tablet, Take 1 tablet by mouth once daily., Disp: , Rfl:     Review of Systems   Constitutional:  Negative for appetite change, fatigue, fever and unexpected weight change.   Respiratory:  Negative for cough, chest tightness, shortness of breath and wheezing.    Cardiovascular:  Negative for chest pain and leg swelling.   Gastrointestinal:  Negative for abdominal pain, constipation, nausea and vomiting.        -heartburn   Genitourinary:  Negative for difficulty urinating, dysuria, frequency and urgency.   Musculoskeletal:  Negative for arthralgias, back pain, myalgias and neck pain.   Skin:  Negative for rash.   Neurological:  Negative for dizziness, weakness, numbness and headaches.   Hematological:  Does not bruise/bleed easily.   Psychiatric/Behavioral:  Negative for dysphoric mood, sleep disturbance and suicidal ideas. The patient is not nervous/anxious.    All other systems reviewed and are negative.         Objective:      Vitals:    03/11/24 1555   BP: 126/76   Pulse: 76   Weight: 70.8 kg (156 lb)   Height: 5' (1.524 m)       Wt Readings from Last 3 Encounters:   03/11/24 70.8 kg (156 lb)   11/08/23 70.8 kg (156 lb)   07/20/23 73.5 kg (162 lb)       Physical Exam  Vitals reviewed.   Constitutional:       General: She is not in acute distress.     Appearance: Normal appearance. She is well-developed and overweight.   HENT:      Head: Normocephalic and atraumatic.   Neck:      Thyroid: No thyromegaly.   Cardiovascular:      Rate and Rhythm: Normal rate and regular rhythm.      Heart sounds: Normal heart sounds. No murmur heard.     No friction rub.   Pulmonary:      Effort: Pulmonary effort is normal.      Breath sounds: Normal breath sounds. No wheezing or rales.   Abdominal:      General: Bowel sounds are normal. There is no distension.      Palpations: Abdomen is soft.      Tenderness: There is no abdominal tenderness.   Musculoskeletal:      Cervical back: Neck supple.   Lymphadenopathy:       Cervical: No cervical adenopathy.   Skin:     General: Skin is warm and dry.      Findings: No rash.   Neurological:      Mental Status: She is alert and oriented to person, place, and time.   Psychiatric:         Attention and Perception: She is attentive.         Speech: Speech normal.         Behavior: Behavior normal.         Thought Content: Thought content normal.         Judgment: Judgment normal.           Assessment:       1. Primary hypertension    2. Menstrual migraine without status migrainosus, not intractable    3. Hiatal hernia    4. Long-term use of high-risk medication               Plan:       Primary hypertension  Comments:  Controlled. Will continue to monitor BP on current medication regimen  Orders:  -     TSH w/reflex to FT4; Future; Expected date: 03/11/2024  -     Urinalysis, Reflex to Urine Culture Urine, Clean Catch; Future; Expected date: 03/11/2024  -     CBC Auto Differential; Future; Expected date: 03/11/2024  -     Lipid Panel; Future; Expected date: 03/11/2024  -     Comprehensive Metabolic Panel; Future; Expected date: 03/11/2024  -     Microalbumin/Creatinine Ratio, Urine; Future; Expected date: 03/11/2024    Menstrual migraine without status migrainosus, not intractable  Comments:  Controlled. Will continue to monitor severity and frequency of headaches.    Hiatal hernia  Comments:  Intermittently symptomatic. Will continue to monitor symptoms    Long-term use of high-risk medication  -     TSH w/reflex to FT4; Future; Expected date: 03/11/2024  -     Urinalysis, Reflex to Urine Culture Urine, Clean Catch; Future; Expected date: 03/11/2024  -     CBC Auto Differential; Future; Expected date: 03/11/2024  -     Lipid Panel; Future; Expected date: 03/11/2024  -     Comprehensive Metabolic Panel; Future; Expected date: 03/11/2024  -     Microalbumin/Creatinine Ratio, Urine; Future; Expected date: 03/11/2024    Labs and/or tests have been ordered for the evaluation/monitoring of  acute/chronic conditions, to be done just before next visit.    Follow up in about 6 months (around 9/11/2024) for HTN, Migraines.        3/11/2024 Tho Alfonso

## 2024-03-11 ENCOUNTER — TELEPHONE (OUTPATIENT)
Dept: FAMILY MEDICINE | Facility: CLINIC | Age: 49
End: 2024-03-11

## 2024-03-11 ENCOUNTER — OFFICE VISIT (OUTPATIENT)
Dept: FAMILY MEDICINE | Facility: CLINIC | Age: 49
End: 2024-03-11
Payer: COMMERCIAL

## 2024-03-11 VITALS
DIASTOLIC BLOOD PRESSURE: 76 MMHG | BODY MASS INDEX: 30.63 KG/M2 | SYSTOLIC BLOOD PRESSURE: 126 MMHG | HEART RATE: 76 BPM | WEIGHT: 156 LBS | HEIGHT: 60 IN

## 2024-03-11 DIAGNOSIS — I10 PRIMARY HYPERTENSION: Primary | ICD-10-CM

## 2024-03-11 DIAGNOSIS — G43.829 MENSTRUAL MIGRAINE WITHOUT STATUS MIGRAINOSUS, NOT INTRACTABLE: ICD-10-CM

## 2024-03-11 DIAGNOSIS — Z79.899 LONG-TERM USE OF HIGH-RISK MEDICATION: ICD-10-CM

## 2024-03-11 DIAGNOSIS — K44.9 HIATAL HERNIA: ICD-10-CM

## 2024-03-11 PROCEDURE — 3078F DIAST BP <80 MM HG: CPT | Mod: CPTII,S$GLB,, | Performed by: FAMILY MEDICINE

## 2024-03-11 PROCEDURE — 3008F BODY MASS INDEX DOCD: CPT | Mod: CPTII,S$GLB,, | Performed by: FAMILY MEDICINE

## 2024-03-11 PROCEDURE — 99214 OFFICE O/P EST MOD 30 MIN: CPT | Mod: S$GLB,,, | Performed by: FAMILY MEDICINE

## 2024-03-11 PROCEDURE — 1160F RVW MEDS BY RX/DR IN RCRD: CPT | Mod: CPTII,S$GLB,, | Performed by: FAMILY MEDICINE

## 2024-03-11 PROCEDURE — 3074F SYST BP LT 130 MM HG: CPT | Mod: CPTII,S$GLB,, | Performed by: FAMILY MEDICINE

## 2024-03-11 PROCEDURE — 1159F MED LIST DOCD IN RCRD: CPT | Mod: CPTII,S$GLB,, | Performed by: FAMILY MEDICINE

## 2024-03-11 NOTE — TELEPHONE ENCOUNTER
----- Message from Audrey Field sent at 3/11/2024  4:20 PM CDT -----  Pt needs a late afternoon 6 month f/u.    [phone]

## 2024-03-26 DIAGNOSIS — I10 PRIMARY HYPERTENSION: Primary | ICD-10-CM

## 2024-03-26 RX ORDER — AMLODIPINE BESYLATE 2.5 MG/1
2.5 TABLET ORAL 2 TIMES DAILY
Qty: 180 TABLET | Refills: 3 | Status: SHIPPED | OUTPATIENT
Start: 2024-03-26

## 2024-05-16 ENCOUNTER — TELEPHONE (OUTPATIENT)
Dept: FAMILY MEDICINE | Facility: CLINIC | Age: 49
End: 2024-05-16
Payer: COMMERCIAL

## 2024-05-16 DIAGNOSIS — Z12.31 VISIT FOR SCREENING MAMMOGRAM: Primary | ICD-10-CM

## 2024-05-16 NOTE — TELEPHONE ENCOUNTER
----- Message from Jennifer Perdue sent at 5/16/2024  8:17 AM CDT -----  Pt received a letter to schedule her mammogram.   [phone]

## 2024-05-22 NOTE — TELEPHONE ENCOUNTER
----- Message from Audrey Field sent at 11/8/2023  4:35 PM CST -----  Pt needs to schedule a 4 month f/u as late in the day as possible.  [phone]       Pt presents with 2 days of burning with urination, pain to lower abdomen and now radiating to lower back.

## 2024-05-27 ENCOUNTER — HOSPITAL ENCOUNTER (OUTPATIENT)
Dept: RADIOLOGY | Facility: HOSPITAL | Age: 49
Discharge: HOME | End: 2024-05-27
Attending: FAMILY MEDICINE
Payer: COMMERCIAL

## 2024-05-27 DIAGNOSIS — Z12.31 VISIT FOR SCREENING MAMMOGRAM: ICD-10-CM

## 2024-05-27 PROCEDURE — 77067 SCR MAMMO BI INCL CAD: CPT | Mod: 26,,, | Performed by: RADIOLOGY

## 2024-05-27 PROCEDURE — 77063 BREAST TOMOSYNTHESIS BI: CPT | Mod: 26,,, | Performed by: RADIOLOGY

## 2024-05-27 PROCEDURE — 77063 BREAST TOMOSYNTHESIS BI: CPT | Mod: TC,PO

## 2024-07-16 ENCOUNTER — OFFICE VISIT (OUTPATIENT)
Dept: CARDIOLOGY | Facility: CLINIC | Age: 49
End: 2024-07-16
Payer: COMMERCIAL

## 2024-07-16 VITALS
BODY MASS INDEX: 30.82 KG/M2 | SYSTOLIC BLOOD PRESSURE: 128 MMHG | HEIGHT: 60 IN | WEIGHT: 157 LBS | RESPIRATION RATE: 16 BRPM | OXYGEN SATURATION: 98 % | DIASTOLIC BLOOD PRESSURE: 80 MMHG

## 2024-07-16 DIAGNOSIS — R07.89 ATYPICAL CHEST PAIN: Primary | ICD-10-CM

## 2024-07-16 DIAGNOSIS — E78.2 MIXED HYPERLIPIDEMIA: ICD-10-CM

## 2024-07-16 DIAGNOSIS — K44.9 HIATAL HERNIA: ICD-10-CM

## 2024-07-16 DIAGNOSIS — I10 HYPERTENSIVE RESPONSE TO EXERCISE: ICD-10-CM

## 2024-07-16 DIAGNOSIS — R10.13 EPIGASTRIC BURNING SENSATION: ICD-10-CM

## 2024-07-16 PROCEDURE — 1160F RVW MEDS BY RX/DR IN RCRD: CPT | Mod: CPTII,S$GLB,, | Performed by: INTERNAL MEDICINE

## 2024-07-16 PROCEDURE — 99214 OFFICE O/P EST MOD 30 MIN: CPT | Mod: S$GLB,,, | Performed by: INTERNAL MEDICINE

## 2024-07-16 PROCEDURE — 3008F BODY MASS INDEX DOCD: CPT | Mod: CPTII,S$GLB,, | Performed by: INTERNAL MEDICINE

## 2024-07-16 PROCEDURE — 99999 PR PBB SHADOW E&M-EST. PATIENT-LVL III: CPT | Mod: PBBFAC,,, | Performed by: INTERNAL MEDICINE

## 2024-07-16 PROCEDURE — 3079F DIAST BP 80-89 MM HG: CPT | Mod: CPTII,S$GLB,, | Performed by: INTERNAL MEDICINE

## 2024-07-16 PROCEDURE — 1159F MED LIST DOCD IN RCRD: CPT | Mod: CPTII,S$GLB,, | Performed by: INTERNAL MEDICINE

## 2024-07-16 PROCEDURE — 3074F SYST BP LT 130 MM HG: CPT | Mod: CPTII,S$GLB,, | Performed by: INTERNAL MEDICINE

## 2024-07-16 RX ORDER — PANTOPRAZOLE SODIUM 40 MG/1
40 TABLET, DELAYED RELEASE ORAL DAILY
Qty: 30 TABLET | Refills: 1 | Status: SHIPPED | OUTPATIENT
Start: 2024-07-16 | End: 2024-09-14

## 2024-07-16 NOTE — PROGRESS NOTES
Subjective:    Patient ID:  Aramis Rodarte is a 49 y.o. female     Chief Complaint   Patient presents with    Hyperlipidemia       HPI:  Ms Aramis Rodarte is a 49 y.o. female is here for follow-up.    Patient still has been having issues with her chest discomfort.  Her chest discomfort is mostly substernal area and is from the epigastric area into the chest.  And she does have discomfort many a times.  And it does occur at random.  Is not associated with exercise.  Patient does go on regular walks and she has no change in the discomfort.    Patient denies any dizziness or lightheadedness denies any palpitations denies any nausea vomiting or diaphoresis or loss of consciousness.    She is currently on amlodipine.    Review of patient's allergies indicates:  No Known Allergies    Past Medical History:   Diagnosis Date    GERD (gastroesophageal reflux disease)     Hiatal hernia      Past Surgical History:   Procedure Laterality Date    NO PAST SURGERIES       Social History     Tobacco Use    Smoking status: Never     Passive exposure: Never    Smokeless tobacco: Never   Substance Use Topics    Alcohol use: Yes     Alcohol/week: 0.0 standard drinks of alcohol     Comment: occasional    Drug use: No     Family History   Problem Relation Name Age of Onset    Breast cancer Mother      Diabetes Maternal Aunt      Hypertension Maternal Aunt      Diabetes Unknown          multiple     Coronary artery disease Maternal Grandmother          non premature    Valvular heart disease Father      Colon cancer Neg Hx          Review of Systems:   Constitution: Negative for diaphoresis and fever.   HEENT: Negative for nosebleeds.    Cardiovascular: Negative for chest pain       No dyspnea on exertion       No leg swelling        No palpitations  Respiratory: Negative for shortness of breath and wheezing.    Hematologic/Lymphatic: Negative for bleeding problem. Does not bruise/bleed easily.   Skin: Negative for color change and rash.    Musculoskeletal: Negative for falls and myalgias.   Gastrointestinal: Negative for hematemesis and hematochezia.   Genitourinary: Negative for hematuria.   Neurological: Negative for dizziness and light-headedness.   Psychiatric/Behavioral: Negative for altered mental status and memory loss.          Objective:        Vitals:    07/16/24 1558   BP: 128/80   Pulse: (P) 70   Resp: 16       Lab Results   Component Value Date    WBC 5.9 10/30/2023    HGB 12.1 10/30/2023    HCT 37.2 10/30/2023     10/30/2023    CHOL 225 (H) 10/30/2023    TRIG 60 10/30/2023    HDL 60 10/30/2023    ALT 10 10/30/2023    AST 14 10/30/2023     10/30/2023    K 3.8 10/30/2023     10/30/2023    CREATININE 0.96 10/30/2023    BUN 14 10/30/2023    CO2 25 10/30/2023    TSH 0.729 01/25/2020    HGBA1C 5.0 01/25/2020    MICROALBUR 1.3 10/30/2023        ECHOCARDIOGRAM RESULTS  Results for orders placed during the hospital encounter of 03/11/22    Echo    Interpretation Summary  · The left ventricle is normal in size with normal systolic function.  · The estimated ejection fraction is 65%.  · Normal left ventricular diastolic function.  · Normal right ventricular size with normal right ventricular systolic function.  · Mild mitral regurgitation.  · Mild to moderate tricuspid regurgitation.        CURRENT/PREVIOUS VISIT EKG  Results for orders placed or performed in visit on 07/20/23   IN OFFICE EKG 12-LEAD (to [place])    Collection Time: 07/20/23  3:54 PM    Narrative    Test Reason : Z00.00,    Vent. Rate : 071 BPM     Atrial Rate : 071 BPM     P-R Int : 110 ms          QRS Dur : 074 ms      QT Int : 402 ms       P-R-T Axes : 054 073 058 degrees     QTc Int : 436 ms    Sinus rhythm with short ND  Otherwise normal ECG    Confirmed by Karl Friedman MD ([id number]) on 7/27/2023 11:33:28 PM    Referred By:  RG           Confirmed By:Karl Friedman MD     No valid procedures specified.   Results for orders placed during the hospital  encounter of 03/11/22    Nuclear Stress - Cardiology Interpreted    Interpretation Summary    Normal myocardial perfusion scan. There is no evidence of myocardial ischemia or infarction.    The gated perfusion images showed an ejection fraction of 69% post stress. Normal ejection fraction is greater than 53%.    There is normal wall motion at rest and post stress.    LV cavity size is normal at rest and normal at stress.    The EKG portion of this study is negative for ischemia.    The patient reported no chest pain during the stress test.    Patient exercised on a Andrew protocol for 7 minutes and 43 seconds and attained a maximum heart rate of 161 beats per minute which is 92% of the maximum predicted heart rate and attained 10.1 METS .  Patient had hypertensive response to exercise.      Physical Exam:  CONSTITUTIONAL: No fever, no chills  HEENT: Normocephalic, atraumatic,pupils reactive to light                 NECK:  No JVD no carotid bruit  CVS: S1S2+, RRR, systolic murmurs,   LUNGS: Clear  ABDOMEN: Soft, NT, BS+  EXTREMITIES: No cyanosis, edema  : No thorpe catheter  NEURO: AAO X 3  PSY: Normal affect      Medication List with Changes/Refills   Current Medications    AMLODIPINE (NORVASC) 2.5 MG TABLET    Take 1 tablet (2.5 mg total) by mouth 2 (two) times daily.    TRI-SPRINTEC, 28, 0.18/0.215/0.25 MG-35 MCG (28) TABLET    Take 1 tablet by mouth once daily.             Assessment:       1. Atypical chest pain    2. Hypertensive response to exercise    3. Mixed hyperlipidemia    4. Epigastric burning sensation    5. Hiatal hernia         Plan:   1. Chest discomfort  Patient has been having chest heaviness and she feels it more has heaviness and discomfort in the epigastric area into the substernal area.  Will schedule her for exercise stress test to evaluate for any ischemia.    2. Hypertensive response to exercise   Patient had a stress test in the past and had hypertensive response and she is currently on  amlodipine 2.5 mg p.o. b.i.d. continue the same.  And her blood pressure is adequately controlled is 128/80.  We will schedule her for 2D echocardiogram for LV function ejection fraction wall motion abnormality.    3. Hiatal hernia and epigastric burning sensation.    Will start her on Protonix 40 mg p.o. daily and see how she tolerates it.  We need to do it for about 2 months at least to see the effects of the medication.  If patient has no change in her discomfort in the 1st month she would need further evaluation.  And probably by the gastroenterologist.    4. I will see her back in the office after the testing is completed.            Problem List Items Addressed This Visit          Cardiac/Vascular    Mixed hyperlipidemia    Hypertensive response to exercise       GI    Epigastric burning sensation    Hiatal hernia       Other    Atypical chest pain - Primary       No follow-ups on file.

## 2024-08-28 ENCOUNTER — TELEPHONE (OUTPATIENT)
Dept: FAMILY MEDICINE | Facility: CLINIC | Age: 49
End: 2024-08-28
Payer: COMMERCIAL

## 2024-09-04 ENCOUNTER — TELEPHONE (OUTPATIENT)
Dept: FAMILY MEDICINE | Facility: CLINIC | Age: 49
End: 2024-09-04
Payer: COMMERCIAL

## 2024-09-04 NOTE — TELEPHONE ENCOUNTER
----- Message from Merline Romero sent at 9/4/2024 10:11 AM CDT -----  Vm- 9:50- pt would like to know if she needs labs before her September appt   [phone]

## 2024-09-09 ENCOUNTER — TELEPHONE (OUTPATIENT)
Dept: FAMILY MEDICINE | Facility: CLINIC | Age: 49
End: 2024-09-09
Payer: COMMERCIAL

## 2024-09-09 DIAGNOSIS — N39.0 URINARY TRACT INFECTION WITHOUT HEMATURIA, SITE UNSPECIFIED: Primary | ICD-10-CM

## 2024-09-09 RX ORDER — FLUCONAZOLE 150 MG/1
150 TABLET ORAL DAILY
Qty: 1 TABLET | Refills: 0 | Status: SHIPPED | OUTPATIENT
Start: 2024-09-09 | End: 2024-09-10

## 2024-09-09 RX ORDER — CEFUROXIME AXETIL 500 MG/1
500 TABLET ORAL EVERY 12 HOURS
Qty: 6 TABLET | Refills: 0 | Status: SHIPPED | OUTPATIENT
Start: 2024-09-09 | End: 2024-09-12

## 2024-09-09 NOTE — TELEPHONE ENCOUNTER
Spoke with pt states she is not having any burning. But does have frequency or urgency. Pt requesting antibiotic and diflucan sent to [facility].     Printed.

## 2024-09-17 ENCOUNTER — TELEPHONE (OUTPATIENT)
Dept: FAMILY MEDICINE | Facility: CLINIC | Age: 49
End: 2024-09-17
Payer: COMMERCIAL

## 2024-09-23 ENCOUNTER — TELEPHONE (OUTPATIENT)
Dept: FAMILY MEDICINE | Facility: CLINIC | Age: 49
End: 2024-09-23
Payer: COMMERCIAL

## 2024-09-23 NOTE — TELEPHONE ENCOUNTER
----- Message from Jennifer Perdue sent at 9/23/2024  8:14 AM CDT -----  Pt needs to reschedule her appointment.   [phone]   Regarding: [place] 53yr fever 101.0 (underarm) usually has a lower body temp of 96.8, ECRP done yesterday,  ----- Message from Cecily Veliz sent at 9/26/2023  6:16 PM CDT -----  Patient Name: Mabel Cruz    Specialist or PCP Name: Dr. Britton Aleman    Symptoms: fever 101.0 (underarm) usually has a lower body temp of 96.8, ECRP done yesterday, shakes, hot and cold     Pregnant (females aged 13-60. If Yes, how long?) : no    Call Back # : [phone]    Which State are you currently located in?: [place]     Name of Clinic Site / Acct# : [facility]    Use following scripting for patients waiting for a callback:   \"Nurse callback times vary based on call volumes; please be aware the return phone call may come from an unidentified or out of state phone number. If your symptoms worsen or become life threatening while waiting, you should seek immediate assistance by calling 911 or going to the ER for evaluation.\"

## 2024-11-02 NOTE — PROGRESS NOTES
SUBJECTIVE:    Patient ID: Aramis Rodarte is a 49 y.o. female.    Chief Complaint: Follow-up (4 mo f/u//no med bottles//flyuarix ordered//)    Pt here to checkup on acute and chronic conditions.    BP is doing ok. Denies SOB/HA    Still working from home.  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 She is feeling kind of numb as a result.     Has not been with the time change and caregiver role.     Continues to have steady chest heaviness, but hasn't gotten any worse. (Siri, NP) Takes nexium every now and then which helps sometimes. No longer taking pantoprazole as it was not really doing anything. Has a hiatal hernia. Is very uncomfortable. Avoids trigger foods.     Migraines before menses have improved. Takes midol as needed.    Had labs done: TSH 2.53, , Ma/Cr 2, fBS 77, GFR 83  ------------------------------------------------------------------------  Mammogram 5/2024, mom with breast cancer in 30s. No genetic testing and no colon cancer.   9/2024 ([facility]), never abnl  Cscope 6/2022, to repeat in 10 years.      Results for orders placed or performed in visit on 03/11/24   TSH w/reflex to FT4    Collection Time: 09/06/24  9:07 AM   Result Value Ref Range    TSH w/reflex to FT4 2.53 mIU/L   CBC Auto Differential    Collection Time: 09/06/24  9:07 AM   Result Value Ref Range    WBC 6.1 3.8 - 10.8 Thousand/uL    RBC 4.05 3.80 - 5.10 Million/uL    Hemoglobin 11.8 11.7 - 15.5 g/dL    Hematocrit 37.3 35.0 - 45.0 %    MCV 92.1 80.0 - 100.0 fL    MCH 29.1 27.0 - 33.0 pg    MCHC 31.6 (L) 32.0 - 36.0 g/dL    RDW 12.5 11.0 - 15.0 %    Platelets 291 140 - 400 Thousand/uL    MPV 11.3 7.5 - 12.5 fL    Neutrophils, Abs 3,392 1,500 - 7,800 cells/uL    Lymph # 1,671 850 - 3,900 cells/uL    Mono # 805 200 - 950 cells/uL    Eos # 171 15 - 500 cells/uL    Baso # 61 0 - 200 cells/uL    Neutrophils Relative 55.6 %    Lymph % 27.4 %    Mono %  13.2 %    Eosinophil % 2.8 %    Basophil % 1.0 %   Lipid Panel    Collection Time: 09/06/24  9:07 AM   Result Value Ref Range    Cholesterol 195 <200 mg/dL    HDL 53 > OR = 50 mg/dL    Triglycerides 107 <150 mg/dL    LDL Cholesterol 121 (H) mg/dL (calc)    HDL/Cholesterol Ratio 3.7 <5.0 (calc)    Non HDL Chol. (LDL+VLDL) 142 (H) <130 mg/dL (calc)   Comprehensive Metabolic Panel    Collection Time: 09/06/24  9:07 AM   Result Value Ref Range    Glucose 77 65 - 99 mg/dL    BUN 16 7 - 25 mg/dL    Creatinine 0.87 0.50 - 0.99 mg/dL    eGFR 83 > OR = 60 mL/min/1.73m2    BUN/Creatinine Ratio SEE NOTE: 6 - 22 (calc)    Sodium 137 135 - 146 mmol/L    Potassium 3.9 3.5 - 5.3 mmol/L    Chloride 102 98 - 110 mmol/L    CO2 27 20 - 32 mmol/L    Calcium 8.6 8.6 - 10.2 mg/dL    Total Protein 6.6 6.1 - 8.1 g/dL    Albumin 3.7 3.6 - 5.1 g/dL    Globulin, Total 2.9 1.9 - 3.7 g/dL (calc)    Albumin/Globulin Ratio 1.3 1.0 - 2.5 (calc)    Total Bilirubin 0.5 0.2 - 1.2 mg/dL    Alkaline Phosphatase 50 31 - 125 U/L    AST 15 10 - 35 U/L    ALT 10 6 - 29 U/L   Urinalysis, Reflex to Urine Culture Urine, Clean Catch    Collection Time: 09/06/24 10:25 AM    Specimen: Urine   Result Value Ref Range    Color, UA YELLOW YELLOW    Appearance, UA CLOUDY (A) CLEAR    Specific Gravity, UA 1.012 1.001 - 1.035    pH, UA 7.0 5.0 - 8.0    Glucose, UA NEGATIVE NEGATIVE    Bilirubin, UA NEGATIVE NEGATIVE    Ketones, UA NEGATIVE NEGATIVE    Occult Blood UA NEGATIVE NEGATIVE    Protein, UA NEGATIVE NEGATIVE    Nitrite, UA NEGATIVE NEGATIVE    Leukocytes, UA 2+ (A) NEGATIVE    WBC Casts, UA 6-10 (A) < OR = 5 /HPF    RBC Casts, UA NONE SEEN < OR = 2 /HPF    Squam Epithel, UA 10-20 (A) < OR = 5 /HPF    Bacteria, UA MANY (A) NONE SEEN /HPF    Hyaline Casts, UA NONE SEEN NONE SEEN /LPF    Service Cmt: SEE COMMENT     Reflexive Urine Culture SEE COMMENT     Urine Culture, Routine SEE COMMENT (A)    Microalbumin/Creatinine Ratio, Urine    Collection Time: 09/06/24  "10:25 AM   Result Value Ref Range    Creatinine, Urine 108 20 - 275 mg/dL    Microalb, Ur 0.2 See Note: mg/dL    Microalb/Creat Ratio 2 <30 mg/g creat           Past Medical History:   Diagnosis Date    GERD (gastroesophageal reflux disease)     Hiatal hernia      Social History     Socioeconomic History    Marital status: Single   Tobacco Use    Smoking status: Never     Passive exposure: Never    Smokeless tobacco: Never   Substance and Sexual Activity    Alcohol use: Yes     Alcohol/week: 0.0 standard drinks of alcohol     Comment: occasional    Drug use: No   Social History Narrative    Active but no exercise    Eating habits "ok"     Social Drivers of Health     Financial Resource Strain: Medium Risk (7/13/2024)    Overall Financial Resource Strain (CARDIA)     Difficulty of Paying Living Expenses: Somewhat hard   Food Insecurity: Food Insecurity Present (7/13/2024)    Hunger Vital Sign     Worried About Running Out of Food in the Last Year: Sometimes true     Ran Out of Food in the Last Year: Sometimes true   Transportation Needs: No Transportation Needs (12/23/2020)    PRAPARE - Transportation     Lack of Transportation (Medical): No     Lack of Transportation (Non-Medical): No   Physical Activity: Sufficiently Active (7/13/2024)    Exercise Vital Sign     Days of Exercise per Week: 3 days     Minutes of Exercise per Session: 60 min   Stress: No Stress Concern Present (7/13/2024)    [employer] - Occupational Stress Questionnaire     Feeling of Stress : Not at all   Housing Stability: Unknown (7/13/2024)    Housing Stability Vital Sign     Unable to Pay for Housing in the Last Year: No     Past Surgical History:   Procedure Laterality Date    NO PAST SURGERIES       Family History   Problem Relation Name Age of Onset    Breast cancer Mother      Diabetes Maternal Aunt      Hypertension Maternal Aunt      Diabetes Unknown          multiple     Coronary artery disease Maternal " Grandmother          non premature    Valvular heart disease Father      Colon cancer Neg Hx         Review of patient's allergies indicates:  No Known Allergies    Current Outpatient Medications:     amLODIPine (NORVASC) 2.5 MG tablet, Take 1 tablet (2.5 mg total) by mouth 2 (two) times daily., Disp: 180 tablet, Rfl: 3    TRI-SPRINTEC, 28, 0.18/0.215/0.25 mg-35 mcg (28) tablet, Take 1 tablet by mouth once daily., Disp: , Rfl:   No current facility-administered medications for this visit.    Review of Systems   Constitutional:  Negative for activity change, appetite change, fatigue, fever and unexpected weight change.   HENT:  Negative for hearing loss, rhinorrhea and trouble swallowing.    Eyes:  Negative for discharge and visual disturbance.   Respiratory:  Negative for cough, chest tightness, shortness of breath and wheezing.    Cardiovascular:  Negative for chest pain, palpitations and leg swelling.   Gastrointestinal:  Negative for abdominal pain, blood in stool, constipation, diarrhea, nausea and vomiting.        -heartburn   Endocrine: Negative for polydipsia and polyuria.   Genitourinary:  Negative for difficulty urinating, dysuria, frequency, hematuria, menstrual problem and urgency.   Musculoskeletal:  Negative for arthralgias, back pain, joint swelling, myalgias and neck pain.   Skin:  Negative for rash.   Neurological:  Negative for dizziness, weakness, numbness and headaches.   Hematological:  Does not bruise/bleed easily.   Psychiatric/Behavioral:  Negative for confusion, dysphoric mood, sleep disturbance and suicidal ideas. The patient is not nervous/anxious.    All other systems reviewed and are negative.         Objective:      Vitals:    11/07/24 1547   BP: 110/68   Pulse: 82   SpO2: 98%   Weight: 69.4 kg (153 lb)   Height: 5' (1.524 m)         Wt Readings from Last 3 Encounters:   11/07/24 69.4 kg (153 lb)   07/16/24 71.2 kg (157 lb)   03/11/24 70.8 kg (156 lb)       Physical Exam  Vitals  reviewed.   Constitutional:       General: She is not in acute distress.     Appearance: Normal appearance. She is well-developed and overweight.   HENT:      Head: Normocephalic and atraumatic.   Neck:      Thyroid: No thyromegaly.   Cardiovascular:      Rate and Rhythm: Normal rate and regular rhythm.      Heart sounds: Normal heart sounds. No murmur heard.     No friction rub.   Pulmonary:      Effort: Pulmonary effort is normal.      Breath sounds: Normal breath sounds. No wheezing or rales.   Abdominal:      General: Bowel sounds are normal. There is no distension.      Palpations: Abdomen is soft.      Tenderness: There is no abdominal tenderness.   Musculoskeletal:      Cervical back: Neck supple.   Lymphadenopathy:      Cervical: No cervical adenopathy.   Skin:     General: Skin is warm and dry.      Findings: No rash.   Neurological:      Mental Status: She is alert and oriented to person, place, and time.   Psychiatric:         Attention and Perception: She is attentive.         Speech: Speech normal.         Behavior: Behavior normal.         Thought Content: Thought content normal.         Judgment: Judgment normal.           Assessment:       1. Primary hypertension    2. Hiatal hernia    3. Mixed hyperlipidemia    4. Caregiver role strain    5. Influenza vaccine administered                 Plan:       Primary hypertension  Comments:  Controlled. Will continue to monitor BP on current medication regimen    Hiatal hernia  Comments:  Stable. Will continue to monitor symptoms    Mixed hyperlipidemia  Comments:  Suboptimal. . Discussed lifestyle changes including diet and exercise.    Caregiver role strain  Comments:  Provided reassurance    Influenza vaccine administered  -     influenza (Flulaval, Fluzone, Fluarix) 45 mcg/0.5 mL IM vaccine (> or = 6 mo) 0.5 mL      Other  Lab results discussed and reviewed with patient.    Follow up in about 6 months (around 5/7/2025) for HTN.        11/7/2024  Tho Alfonso

## 2024-11-07 ENCOUNTER — OFFICE VISIT (OUTPATIENT)
Dept: FAMILY MEDICINE | Facility: CLINIC | Age: 49
End: 2024-11-07
Payer: COMMERCIAL

## 2024-11-07 VITALS
BODY MASS INDEX: 30.04 KG/M2 | OXYGEN SATURATION: 98 % | WEIGHT: 153 LBS | HEART RATE: 82 BPM | SYSTOLIC BLOOD PRESSURE: 110 MMHG | DIASTOLIC BLOOD PRESSURE: 68 MMHG | HEIGHT: 60 IN

## 2024-11-07 DIAGNOSIS — Z63.8 CAREGIVER ROLE STRAIN: ICD-10-CM

## 2024-11-07 DIAGNOSIS — I10 PRIMARY HYPERTENSION: Primary | ICD-10-CM

## 2024-11-07 DIAGNOSIS — Z23 INFLUENZA VACCINE ADMINISTERED: ICD-10-CM

## 2024-11-07 DIAGNOSIS — K44.9 HIATAL HERNIA: ICD-10-CM

## 2024-11-07 DIAGNOSIS — E78.2 MIXED HYPERLIPIDEMIA: ICD-10-CM

## 2024-11-07 SDOH — SOCIAL DETERMINANTS OF HEALTH (SDOH): OTHER SPECIFIED PROBLEMS RELATED TO PRIMARY SUPPORT GROUP: Z63.8

## 2025-02-10 ENCOUNTER — TELEPHONE (OUTPATIENT)
Dept: CARDIOLOGY | Facility: CLINIC | Age: 50
End: 2025-02-10
Payer: COMMERCIAL

## 2025-02-10 NOTE — TELEPHONE ENCOUNTER
----- Message from Michelleantwon sent at 2/10/2025  8:42 AM CST -----  Regarding: appt  Type:  Sooner Apoointment Request      Name of Caller:pt     When is the first available appointment?dept booked     Symptoms:annual fu      Best Call Back Number:[phone]      Additional Information: pt wants to get schedule with . She used to see dr morales. She needs to be schedule in July.  please call to discuss.

## 2025-02-24 ENCOUNTER — TELEPHONE (OUTPATIENT)
Dept: FAMILY MEDICINE | Facility: CLINIC | Age: 50
End: 2025-02-24
Payer: COMMERCIAL

## 2025-02-24 NOTE — TELEPHONE ENCOUNTER
----- Message from Jennifer sent at 2/24/2025 12:50 PM CST -----  Patient would like a later time on the day that her appointment is scheduled if possible. [phone]

## 2025-03-19 DIAGNOSIS — I10 PRIMARY HYPERTENSION: ICD-10-CM

## 2025-03-19 RX ORDER — AMLODIPINE BESYLATE 2.5 MG/1
2.5 TABLET ORAL 2 TIMES DAILY
Qty: 180 TABLET | Refills: 0 | OUTPATIENT
Start: 2025-03-19

## 2025-03-19 NOTE — TELEPHONE ENCOUNTER
----- Message from Ollie sent at 3/19/2025 10:33 AM CDT -----  Regarding: refill  Type:  RX Refill RequestWho Called: ptRefill or New Rx:refillRX Name and Strength:amLODIPine (NORVASC) 2.5 MG tablet 180 tablet 3 3/26/2024 - Sig - Route: Take 1 tablet (2.5 mg total) by mouth 2 (two) times daily. - Oral Sent to pharmacy as: amLODIPine (NORVASC) 2.5 MG tablet Notes to Pharmacy: . E-Prescribing Status: Receipt confirmed by pharmacy (3/26/2024 12:24 PM CDT) How is the patient currently taking it? (ex. 1XDay):see aboveIs this a 30 day or 90 day RX:see abovePreferred Pharmacy with phone number:[id number] [place] - [address] [place], [place] [address] [address]Picfs: [phone] Fax: [phone] or Mail Order:local Ordering Provider:jillian Royal Call Back Number:[phone] Information: pt st she is out of meds.  Please call to discuss.

## 2025-03-21 NOTE — TELEPHONE ENCOUNTER
----- Message from Suha sent at 3/21/2025 10:26 AM CDT -----  Regarding: refill  Contact: patient  Type:  RX Refill RequestWho Called:  patientRefill or New Rx:  refillRX Name and Strength:  amLODIPine (NORVASC) 2.5 MG tabletHow is the patient currently taking it? (ex. 1XDay):  as directedIs this a 30 day or 90 day RX:  90Preferred Pharmacy with phone number:  [id number] - [place], [place] - [address] [place] [address]Fosuq: [phone] Fax: [phone] or Mail Order:  localOrdering Provider:  Dr. Ott Call Back Number:  [phone] Information:  Please call patient to advise.  Thanks!

## 2025-03-24 RX ORDER — AMLODIPINE BESYLATE 2.5 MG/1
2.5 TABLET ORAL 2 TIMES DAILY
Qty: 180 TABLET | Refills: 3 | Status: SHIPPED | OUTPATIENT
Start: 2025-03-24

## 2025-03-24 NOTE — TELEPHONE ENCOUNTER
----- Message from Mary sent at 3/24/2025  8:50 AM CDT -----  Contact: patient  Type:  Needs Medical AdviceWho Called: patientWould the patient rather a call back or a response via [facility]? [facility] Call Back Number: [phone] Additional Information: Please call with rx refill status for  amLODIPine (NORVASC) 2.5 MG tablet

## 2025-05-12 ENCOUNTER — OFFICE VISIT (OUTPATIENT)
Dept: FAMILY MEDICINE | Facility: CLINIC | Age: 50
End: 2025-05-12
Payer: COMMERCIAL

## 2025-05-12 VITALS
WEIGHT: 154 LBS | DIASTOLIC BLOOD PRESSURE: 78 MMHG | OXYGEN SATURATION: 99 % | HEIGHT: 60 IN | BODY MASS INDEX: 30.23 KG/M2 | SYSTOLIC BLOOD PRESSURE: 125 MMHG | HEART RATE: 73 BPM

## 2025-05-12 DIAGNOSIS — I10 PRIMARY HYPERTENSION: Primary | ICD-10-CM

## 2025-05-12 DIAGNOSIS — K44.9 HIATAL HERNIA: ICD-10-CM

## 2025-05-12 DIAGNOSIS — Z12.31 OTHER SCREENING MAMMOGRAM: ICD-10-CM

## 2025-05-12 DIAGNOSIS — G43.829 MENSTRUAL MIGRAINE WITHOUT STATUS MIGRAINOSUS, NOT INTRACTABLE: ICD-10-CM

## 2025-05-12 DIAGNOSIS — Z79.899 LONG-TERM USE OF HIGH-RISK MEDICATION: ICD-10-CM

## 2025-05-12 PROCEDURE — 3074F SYST BP LT 130 MM HG: CPT | Mod: CPTII,S$GLB,, | Performed by: FAMILY MEDICINE

## 2025-05-12 PROCEDURE — 3008F BODY MASS INDEX DOCD: CPT | Mod: CPTII,S$GLB,, | Performed by: FAMILY MEDICINE

## 2025-05-12 PROCEDURE — 99214 OFFICE O/P EST MOD 30 MIN: CPT | Mod: S$GLB,,, | Performed by: FAMILY MEDICINE

## 2025-05-12 PROCEDURE — 1159F MED LIST DOCD IN RCRD: CPT | Mod: CPTII,S$GLB,, | Performed by: FAMILY MEDICINE

## 2025-05-12 PROCEDURE — 1160F RVW MEDS BY RX/DR IN RCRD: CPT | Mod: CPTII,S$GLB,, | Performed by: FAMILY MEDICINE

## 2025-05-12 PROCEDURE — 3078F DIAST BP <80 MM HG: CPT | Mod: CPTII,S$GLB,, | Performed by: FAMILY MEDICINE

## 2025-05-12 NOTE — PROGRESS NOTES
SUBJECTIVE:    Patient ID: Aramis Rodarte is a 50 y.o. female.    Chief Complaint: Follow-up (6 mo f/u//no med bottles//)    Pt here to checkup on acute and chronic conditions.    BP is doing ok. Denies SOB/HA. Still goes walking 3 times a week and does weights at home.     Still working from home.  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    Still has a little chest heaviness. (Siri, NP) Takes nexium rarely. Watches her diet.  Has a hiatal hernia. Is very uncomfortable. Avoids trigger foods.     Migraines before menses are doing well.  Takes a little tylenol and she does ok. She is tired of taking ocp but they keep her regular.     No labs done.    Since last visit her dad passed about 6 months ago.  Had aneurysms in his heart.  ------------------------------------------------------------------------  Mammogram 5/2024, mom with breast cancer in 30s. No genetic testing and no colon cancer.   Pap 9/2024 ([facility]), never abnl  Cscope 6/2022, to repeat in 10 years.      Results for orders placed or performed in visit on 03/11/24   TSH w/reflex to FT4    Collection Time: 09/06/24  9:07 AM   Result Value Ref Range    TSH w/reflex to FT4 2.53 mIU/L   CBC Auto Differential    Collection Time: 09/06/24  9:07 AM   Result Value Ref Range    WBC 6.1 3.8 - 10.8 Thousand/uL    RBC 4.05 3.80 - 5.10 Million/uL    Hemoglobin 11.8 11.7 - 15.5 g/dL    Hematocrit 37.3 35.0 - 45.0 %    MCV 92.1 80.0 - 100.0 fL    MCH 29.1 27.0 - 33.0 pg    MCHC 31.6 (L) 32.0 - 36.0 g/dL    RDW 12.5 11.0 - 15.0 %    Platelets 291 140 - 400 Thousand/uL    MPV 11.3 7.5 - 12.5 fL    Neutrophils, Abs 3,392 1,500 - 7,800 cells/uL    Lymph # 1,671 850 - 3,900 cells/uL    Mono # 805 200 - 950 cells/uL    Eos # 171 15 - 500 cells/uL    Baso # 61 0 - 200 cells/uL    Neutrophils Relative 55.6 %    Lymph % 27.4 %    Mono % 13.2 %    Eosinophil % 2.8 %    Basophil % 1.0 %   Lipid Panel    Collection Time: 09/06/24  9:07 AM   Result Value Ref Range    Cholesterol 195  <200 mg/dL    HDL 53 > OR = 50 mg/dL    Triglycerides 107 <150 mg/dL    LDL Cholesterol 121 (H) mg/dL (calc)    HDL/Cholesterol Ratio 3.7 <5.0 (calc)    Non HDL Chol. (LDL+VLDL) 142 (H) <130 mg/dL (calc)   Comprehensive Metabolic Panel    Collection Time: 09/06/24  9:07 AM   Result Value Ref Range    Glucose 77 65 - 99 mg/dL    BUN 16 7 - 25 mg/dL    Creatinine 0.87 0.50 - 0.99 mg/dL    eGFR 83 > OR = 60 mL/min/1.73m2    BUN/Creatinine Ratio SEE NOTE: 6 - 22 (calc)    Sodium 137 135 - 146 mmol/L    Potassium 3.9 3.5 - 5.3 mmol/L    Chloride 102 98 - 110 mmol/L    CO2 27 20 - 32 mmol/L    Calcium 8.6 8.6 - 10.2 mg/dL    Total Protein 6.6 6.1 - 8.1 g/dL    Albumin 3.7 3.6 - 5.1 g/dL    Globulin, Total 2.9 1.9 - 3.7 g/dL (calc)    Albumin/Globulin Ratio 1.3 1.0 - 2.5 (calc)    Total Bilirubin 0.5 0.2 - 1.2 mg/dL    Alkaline Phosphatase 50 31 - 125 U/L    AST 15 10 - 35 U/L    ALT 10 6 - 29 U/L   Urinalysis, Reflex to Urine Culture Urine, Clean Catch    Collection Time: 09/06/24 10:25 AM    Specimen: Urine   Result Value Ref Range    Color, UA YELLOW YELLOW    Appearance, UA CLOUDY (A) CLEAR    Specific Gravity, UA 1.012 1.001 - 1.035    pH, UA 7.0 5.0 - 8.0    Glucose, UA NEGATIVE NEGATIVE    Bilirubin, UA NEGATIVE NEGATIVE    Ketones, UA NEGATIVE NEGATIVE    Occult Blood UA NEGATIVE NEGATIVE    Protein, UA NEGATIVE NEGATIVE    Nitrite, UA NEGATIVE NEGATIVE    Leukocytes, UA 2+ (A) NEGATIVE    WBC Casts, UA 6-10 (A) < OR = 5 /HPF    RBC Casts, UA NONE SEEN < OR = 2 /HPF    Squam Epithel, UA 10-20 (A) < OR = 5 /HPF    Bacteria, UA MANY (A) NONE SEEN /HPF    Hyaline Casts, UA NONE SEEN NONE SEEN /LPF    Service Cmt: SEE COMMENT     Reflexive Urine Culture SEE COMMENT     Urine Culture, Routine SEE COMMENT (A)    Microalbumin/Creatinine Ratio, Urine    Collection Time: 09/06/24 10:25 AM   Result Value Ref Range    Creatinine, Urine 108 20 - 275 mg/dL    Microalb, Ur 0.2 See Note: mg/dL    Microalb/Creat Ratio 2 <30 mg/g  "creat           Past Medical History:   Diagnosis Date    GERD (gastroesophageal reflux disease)     Hiatal hernia      Social History     Socioeconomic History    Marital status: Single   Tobacco Use    Smoking status: Never     Passive exposure: Never    Smokeless tobacco: Never   Substance and Sexual Activity    Alcohol use: Yes     Alcohol/week: 0.0 standard drinks of alcohol     Comment: occasional    Drug use: No   Social History Narrative    Active but no exercise    Eating habits "ok"     Social Drivers of Health     Financial Resource Strain: Low Risk  (5/5/2025)    Overall Financial Resource Strain (CARDIA)     Difficulty of Paying Living Expenses: Not hard at all   Food Insecurity: No Food Insecurity (5/5/2025)    Hunger Vital Sign     Worried About Running Out of Food in the Last Year: Never true     Ran Out of Food in the Last Year: Never true   Transportation Needs: No Transportation Needs (5/5/2025)    PRAPARE - Transportation     Lack of Transportation (Medical): No     Lack of Transportation (Non-Medical): No   Physical Activity: Insufficiently Active (5/5/2025)    Exercise Vital Sign     Days of Exercise per Week: 3 days     Minutes of Exercise per Session: 40 min   Stress: No Stress Concern Present (5/5/2025)    [employer] [employer] - Occupational Stress Questionnaire     Feeling of Stress : Not at all   Housing Stability: Low Risk  (5/5/2025)    Housing Stability Vital Sign     Unable to Pay for Housing in the Last Year: No     Number of Times Moved in the Last Year: 0     Homeless in the Last Year: No     Past Surgical History:   Procedure Laterality Date    NO PAST SURGERIES       Family History   Problem Relation Name Age of Onset    Breast cancer Mother      Diabetes Maternal Aunt      Hypertension Maternal Aunt      Diabetes Unknown          multiple     Coronary artery disease Maternal Grandmother          non premature    Valvular heart disease Father      Colon cancer " Neg Hx         Review of patient's allergies indicates:  No Known Allergies    Current Outpatient Medications:     amLODIPine (NORVASC) 2.5 MG tablet, Take 1 tablet (2.5 mg total) by mouth 2 (two) times daily., Disp: 180 tablet, Rfl: 3    TRI-SPRINTEC, 28, 0.18/0.215/0.25 mg-35 mcg (28) tablet, Take 1 tablet by mouth once daily., Disp: , Rfl:     Review of Systems   Constitutional:  Negative for activity change, appetite change, fatigue, fever and unexpected weight change.   HENT:  Negative for hearing loss, rhinorrhea and trouble swallowing.    Eyes:  Negative for discharge and visual disturbance.   Respiratory:  Negative for cough, chest tightness, shortness of breath and wheezing.    Cardiovascular:  Negative for chest pain, palpitations and leg swelling.   Gastrointestinal:  Negative for abdominal pain, blood in stool, constipation, diarrhea, nausea and vomiting.        -heartburn   Endocrine: Negative for polydipsia and polyuria.   Genitourinary:  Negative for difficulty urinating, dysuria, frequency, hematuria, menstrual problem and urgency.   Musculoskeletal:  Negative for arthralgias, back pain, joint swelling, myalgias and neck pain.   Skin:  Negative for rash.   Neurological:  Negative for dizziness, weakness, numbness and headaches.   Hematological:  Does not bruise/bleed easily.   Psychiatric/Behavioral:  Negative for confusion, dysphoric mood, sleep disturbance and suicidal ideas. The patient is not nervous/anxious.    All other systems reviewed and are negative.         Objective:      Vitals:    05/12/25 1605   BP: 125/78   Pulse: 73   SpO2: 99%   Weight: 69.9 kg (154 lb)   Height: 5' (1.524 m)           Wt Readings from Last 3 Encounters:   05/12/25 69.9 kg (154 lb)   11/07/24 69.4 kg (153 lb)   07/16/24 71.2 kg (157 lb)       Physical Exam  Vitals reviewed.   Constitutional:       General: She is not in acute distress.     Appearance: Normal appearance. She is well-developed and overweight.   HENT:       Head: Normocephalic and atraumatic.   Neck:      Thyroid: No thyromegaly.   Cardiovascular:      Rate and Rhythm: Normal rate and regular rhythm.      Heart sounds: Normal heart sounds. No murmur heard.     No friction rub.   Pulmonary:      Effort: Pulmonary effort is normal.      Breath sounds: Normal breath sounds. No wheezing or rales.   Abdominal:      General: Bowel sounds are normal. There is no distension.      Palpations: Abdomen is soft.      Tenderness: There is no abdominal tenderness.   Musculoskeletal:      Cervical back: Neck supple.   Lymphadenopathy:      Cervical: No cervical adenopathy.   Skin:     General: Skin is warm and dry.      Findings: No rash.   Neurological:      Mental Status: She is alert and oriented to person, place, and time.   Psychiatric:         Attention and Perception: She is attentive.         Speech: Speech normal.         Behavior: Behavior normal.         Thought Content: Thought content normal.         Judgment: Judgment normal.           Assessment:       1. Primary hypertension    2. Menstrual migraine without status migrainosus, not intractable    3. Hiatal hernia    4. Other screening mammogram    5. Long-term use of high-risk medication                   Plan:       Primary hypertension  Comments:  Controlled. Will continue to monitor BP on current medication.  Orders:  -     TSH w/reflex to FT4; Future; Expected date: 05/12/2025  -     Urinalysis, Reflex to Urine Culture Urine, Clean Catch; Future; Expected date: 05/12/2025  -     CBC Auto Differential; Future; Expected date: 05/12/2025  -     Lipid Panel; Future; Expected date: 05/12/2025  -     Comprehensive Metabolic Panel; Future; Expected date: 05/12/2025  -     Microalbumin/Creatinine Ratio, Urine; Future; Expected date: 05/12/2025    Menstrual migraine without status migrainosus, not intractable  Comments:  Controlled. Will continue to monitor symptoms.    Hiatal hernia  Comments:  Controlled. Will  continue to monitor symptoms on prn ppi    Other screening mammogram  -     Mammo Digital Screening Bilat w/ [facility] (XPD); Future; Expected date: 05/12/2025    Long-term use of high-risk medication  -     TSH w/reflex to FT4; Future; Expected date: 05/12/2025  -     Urinalysis, Reflex to Urine Culture Urine, Clean Catch; Future; Expected date: 05/12/2025  -     CBC Auto Differential; Future; Expected date: 05/12/2025  -     Lipid Panel; Future; Expected date: 05/12/2025  -     Comprehensive Metabolic Panel; Future; Expected date: 05/12/2025  -     Microalbumin/Creatinine Ratio, Urine; Future; Expected date: 05/12/2025      Labs and/or tests have been ordered for the evaluation/monitoring of acute/chronic conditions, to be done just before next visit.    Follow up in about 6 months (around 11/12/2025) for HTN, LABS.        5/12/2025 Tho Alfonso

## 2025-05-28 ENCOUNTER — HOSPITAL ENCOUNTER (OUTPATIENT)
Dept: RADIOLOGY | Facility: HOSPITAL | Age: 50
Discharge: HOME | End: 2025-05-28
Attending: FAMILY MEDICINE
Payer: COMMERCIAL

## 2025-05-28 VITALS — BODY MASS INDEX: 30.23 KG/M2 | WEIGHT: 154 LBS | HEIGHT: 60 IN

## 2025-05-28 DIAGNOSIS — Z12.31 OTHER SCREENING MAMMOGRAM: ICD-10-CM

## 2025-05-28 PROCEDURE — 77063 BREAST TOMOSYNTHESIS BI: CPT | Mod: 26,,, | Performed by: RADIOLOGY

## 2025-05-28 PROCEDURE — 77067 SCR MAMMO BI INCL CAD: CPT | Mod: 26,,, | Performed by: RADIOLOGY

## 2025-05-28 PROCEDURE — 77063 BREAST TOMOSYNTHESIS BI: CPT | Mod: TC,PO

## 2025-07-16 ENCOUNTER — OFFICE VISIT (OUTPATIENT)
Dept: CARDIOLOGY | Facility: CLINIC | Age: 50
End: 2025-07-16
Payer: COMMERCIAL

## 2025-07-16 VITALS
SYSTOLIC BLOOD PRESSURE: 118 MMHG | HEART RATE: 78 BPM | HEIGHT: 60 IN | BODY MASS INDEX: 30.52 KG/M2 | OXYGEN SATURATION: 98 % | WEIGHT: 155.44 LBS | DIASTOLIC BLOOD PRESSURE: 70 MMHG

## 2025-07-16 DIAGNOSIS — E78.2 MIXED HYPERLIPIDEMIA: ICD-10-CM

## 2025-07-16 DIAGNOSIS — I10 PRIMARY HYPERTENSION: Primary | ICD-10-CM

## 2025-07-16 DIAGNOSIS — I36.1 NONRHEUMATIC TRICUSPID VALVE REGURGITATION: ICD-10-CM

## 2025-07-16 DIAGNOSIS — I34.0 NONRHEUMATIC MITRAL VALVE REGURGITATION: ICD-10-CM

## 2025-07-16 PROCEDURE — 1160F RVW MEDS BY RX/DR IN RCRD: CPT | Mod: CPTII,S$GLB,, | Performed by: STUDENT IN AN ORGANIZED HEALTH CARE EDUCATION/TRAINING PROGRAM

## 2025-07-16 PROCEDURE — 93000 ELECTROCARDIOGRAM COMPLETE: CPT | Mod: S$GLB,,, | Performed by: STUDENT IN AN ORGANIZED HEALTH CARE EDUCATION/TRAINING PROGRAM

## 2025-07-16 PROCEDURE — 99999 PR PBB SHADOW E&M-EST. PATIENT-LVL III: CPT | Mod: PBBFAC,,, | Performed by: STUDENT IN AN ORGANIZED HEALTH CARE EDUCATION/TRAINING PROGRAM

## 2025-07-16 PROCEDURE — 3074F SYST BP LT 130 MM HG: CPT | Mod: CPTII,S$GLB,, | Performed by: STUDENT IN AN ORGANIZED HEALTH CARE EDUCATION/TRAINING PROGRAM

## 2025-07-16 PROCEDURE — 3008F BODY MASS INDEX DOCD: CPT | Mod: CPTII,S$GLB,, | Performed by: STUDENT IN AN ORGANIZED HEALTH CARE EDUCATION/TRAINING PROGRAM

## 2025-07-16 PROCEDURE — 99214 OFFICE O/P EST MOD 30 MIN: CPT | Mod: S$GLB,,, | Performed by: STUDENT IN AN ORGANIZED HEALTH CARE EDUCATION/TRAINING PROGRAM

## 2025-07-16 PROCEDURE — 1159F MED LIST DOCD IN RCRD: CPT | Mod: CPTII,S$GLB,, | Performed by: STUDENT IN AN ORGANIZED HEALTH CARE EDUCATION/TRAINING PROGRAM

## 2025-07-16 PROCEDURE — 3078F DIAST BP <80 MM HG: CPT | Mod: CPTII,S$GLB,, | Performed by: STUDENT IN AN ORGANIZED HEALTH CARE EDUCATION/TRAINING PROGRAM

## 2025-08-02 LAB
OHS QRS DURATION: 76 MS
OHS QTC CALCULATION: 441 MS

## 2025-08-02 NOTE — PROGRESS NOTES
Anesthesia Evaluation     Patient summary reviewed and Nursing notes reviewed   NPO Solid Status: > 8 hours  NPO Liquid Status: > 6 hours           Airway   Mallampati: II  Dental          Pulmonary - negative pulmonary ROS and normal exam   Cardiovascular - negative cardio ROS  Exercise tolerance: good (4-7 METS)    Rhythm: regular  Rate: normal        Neuro/Psych- negative ROS  GI/Hepatic/Renal/Endo    (+)  GERD,      Musculoskeletal     Abdominal  - normal exam   Substance History - negative use     OB/GYN negative ob/gyn ROS         Other   arthritis,                      Anesthesia Plan    ASA 2     general     intravenous induction     Anesthetic plan, all risks, benefits, and alternatives have been provided, discussed and informed consent has been obtained with: patient.       Part of this note has been created using [employer] dictation system. Errors in transcription may not be completely avoided. Some syntax or spelling errors may persist. Please contact the author of this note for any clarification.    Patient ID:  Aramis Rodarte  50 y.o.  female      Assessment:     1. Primary hypertension    2. Mixed hyperlipidemia    3. Nonrheumatic mitral valve regurgitation    4. Nonrheumatic tricuspid valve regurgitation        Plan:     Asymptomatic from cardiac standpoint.  Continue with preventive care. Discussed ASCVD prevention, risk factor control and lifestyle interventions including diet, exercise and weight loss.  Recommend aiming for a goal LDL<70.  Wants to work on lifestyle interventions 1st before pharmacotherapy.  Continue amlodipine 2.5 mg twice a day.  Blood pressure is controlled.  Restrict daily sodium intake  TTE for surveillance of mild mitral regurgitation and mild-to-moderate tricuspid regurgitation seen on echo in 2022.    Subjective:     Chief Complaint   Patient presents with    Hypertension     Annual check up        HPI:  Aramis Rodarte is a 50 y.o. female who presents today for routine follow-up.  She is a former patient of Dr. Jeronimo Zaragoza.  She has a history of hypertension, hyperlipidemia, mitral regurgitation, tricuspid regurgitation, GERD and hiatal hernia. Reports no angina, dyspnea, orthopnea, PND, swelling of extremities, palpitations, syncope or near syncope.  No exertional symptoms.      PREVIOUS CARDIAC TESTING/PROCEDURES HISTORY:  Most Recent Echocardiogram Results  Results for orders placed during the hospital encounter of 03/11/22    Echo    Interpretation Summary  · The left ventricle is normal in size with normal systolic function.  · The estimated ejection fraction is 65%.  · Normal left ventricular diastolic function.  · Normal right ventricular size with normal right ventricular systolic function.  · Mild mitral regurgitation.  · Mild to moderate  tricuspid regurgitation.      Most Recent Stress Test Results  Results for orders placed during the hospital encounter of 03/11/22    Nuclear Stress - Cardiology Interpreted    Interpretation Summary    Normal myocardial perfusion scan. There is no evidence of myocardial ischemia or infarction.    The gated perfusion images showed an ejection fraction of 69% post stress. Normal ejection fraction is greater than 53%.    There is normal wall motion at rest and post stress.    LV cavity size is normal at rest and normal at stress.    The EKG portion of this study is negative for ischemia.    The patient reported no chest pain during the stress test.    Patient exercised on a Andrew protocol for 7 minutes and 43 seconds and attained a maximum heart rate of 161 beats per minute which is 92% of the maximum predicted heart rate and attained 10.1 METS .  Patient had hypertensive response to exercise.      Most Recent EKG Results  Results for orders placed or performed in visit on 07/16/25   IN OFFICE EKG 12-LEAD (to [facility])    Collection Time: 07/16/25  2:38 PM   Result Value Ref Range    QRS Duration 76 ms    [facility] QTC Calculation 441 ms    Narrative    Test Reason : I10,E78.2,R07.89,    Vent. Rate :  74 BPM     Atrial Rate :  74 BPM     P-R Int : 114 ms          QRS Dur :  76 ms      QT Int : 398 ms       P-R-T Axes :  45  31  24 degrees    QTcB Int : 441 ms    Normal sinus rhythm  Normal ECG  When compared with ECG of 20-Jul-2023 15:54,  T wave amplitude has decreased in Anterior leads    Referred By:            Confirmed By:          Review of patient's allergies indicates:  No Known Allergies    Past Medical History:   Diagnosis Date    GERD (gastroesophageal reflux disease)     Hiatal hernia      Past Surgical History:   Procedure Laterality Date    NO PAST SURGERIES       Social History[1]       REVIEW OF SYSTEMS  CONSTITUTIONAL: No chills.   EYES: No double vision  NEURO: No alteration in mental status  RESPIRATORY: No  "wheezing.    CARDIOVASCULAR: See HPI   GI: No melena/hematochezia/hematemesis, no diarrhea, no nausea or vomiting.   : No dysuria and frequency, no hematuria  SKIN: No sloughing  PSYCHIATRIC: No hallucinations  ENDOCRINE: no polyphagia  MUSCULOSKELETAL: no muscle weakness        Objective:        Vitals:    07/16/25 1445   BP: 118/70   Pulse: 78       Wt Readings from Last 2 Encounters:   07/16/25 70.5 kg (155 lb 6.8 oz)   05/28/25 69.9 kg (154 lb)       PHYSICAL EXAM  CONSTITUTIONAL: In no apparent distress  HEENT: Normocephalic. Pupils normal and conjunctivae normal.   LUNGS: B/L air entry to the lungs  HEART: Normal rate and regular rhythm. Normal S1 and S2.   ABDOMEN: soft, non-tender  NEURO: AAO X 3, no gross sensory or motor deficits  SKIN:  Intact  Psych:  Normal affect     Lab Results   Component Value Date    WBC 6.1 09/06/2024    HGB 11.8 09/06/2024    HCT 37.3 09/06/2024     09/06/2024    CHOL 195 09/06/2024    TRIG 107 09/06/2024    HDL 53 09/06/2024    ALT 10 09/06/2024    AST 15 09/06/2024     09/06/2024    K 3.9 09/06/2024     09/06/2024    CREATININE 0.87 09/06/2024    BUN 16 09/06/2024    CO2 27 09/06/2024    TSH 0.729 01/25/2020    HGBA1C 5.0 01/25/2020    MICROALBUR 0.2 09/06/2024        @  Lab Results   Component Value Date    CHOL 195 09/06/2024    CHOL 225 (H) 10/30/2023    CHOL 182 04/30/2022     Lab Results   Component Value Date    HDL 53 09/06/2024    HDL 60 10/30/2023    HDL 53 04/30/2022     Lab Results   Component Value Date    LDLCALC 121 (H) 09/06/2024    LDLCALC 150 (H) 10/30/2023    LDLCALC 114 (H) 04/30/2022     No results found for: "DLDL"  Lab Results   Component Value Date    TRIG 107 09/06/2024    TRIG 60 10/30/2023    TRIG 66 04/30/2022         Current Outpatient Medications   Medication Instructions    amLODIPine (NORVASC) 2.5 mg, Oral, 2 times daily    TRI-SPRINTEC, 28, 0.18/0.215/0.25 mg-35 mcg (28) tablet 1 tablet, Daily       Medication List with " Changes/Refills   Current Medications    AMLODIPINE (NORVASC) 2.5 MG TABLET    Take 1 tablet (2.5 mg total) by mouth 2 (two) times daily.    TRI-SPRINTEC, 28, 0.18/0.215/0.25 MG-35 MCG (28) TABLET    Take 1 tablet by mouth once daily.         All pertinent labs, imaging, and EKGs reviewed.  Patient's most recent EKG tracing was personally interpreted by this provider.    Problem List Items Addressed This Visit       Mixed hyperlipidemia    Relevant Orders    IN OFFICE EKG 12-LEAD (to [facility])     Other Visit Diagnoses         Primary hypertension    -  Primary    Relevant Orders    IN OFFICE EKG 12-LEAD (to Muse)      Nonrheumatic mitral valve regurgitation        Relevant Orders    Echo      Nonrheumatic tricuspid valve regurgitation                Follow up in about 1 year (around 7/16/2026).       [1]   Social History  Tobacco Use    Smoking status: Never     Passive exposure: Never    Smokeless tobacco: Never   Substance Use Topics    Alcohol use: Yes     Alcohol/week: 0.0 standard drinks of alcohol     Comment: occasional    Drug use: No